# Patient Record
Sex: MALE | Race: WHITE | Employment: UNEMPLOYED | URBAN - METROPOLITAN AREA
[De-identification: names, ages, dates, MRNs, and addresses within clinical notes are randomized per-mention and may not be internally consistent; named-entity substitution may affect disease eponyms.]

---

## 2019-02-15 ENCOUNTER — APPOINTMENT (OUTPATIENT)
Dept: GENERAL RADIOLOGY | Age: 29
End: 2019-02-15
Attending: EMERGENCY MEDICINE
Payer: MEDICAID

## 2019-02-15 ENCOUNTER — HOSPITAL ENCOUNTER (EMERGENCY)
Age: 29
Discharge: HOME | End: 2019-02-15
Attending: EMERGENCY MEDICINE
Payer: MEDICAID

## 2019-02-15 VITALS
SYSTOLIC BLOOD PRESSURE: 142 MMHG | DIASTOLIC BLOOD PRESSURE: 87 MMHG | RESPIRATION RATE: 18 BRPM | TEMPERATURE: 98.3 F | WEIGHT: 193 LBS | HEIGHT: 71 IN | OXYGEN SATURATION: 97 % | BODY MASS INDEX: 27.02 KG/M2 | HEART RATE: 108 BPM

## 2019-02-15 DIAGNOSIS — J06.9 ACUTE URI: Primary | ICD-10-CM

## 2019-02-15 LAB
DEPRECATED S PYO AG THROAT QL EIA: NEGATIVE
FLUAV AG NPH QL IA: NEGATIVE
FLUBV AG NOSE QL IA: NEGATIVE

## 2019-02-15 PROCEDURE — 99283 EMERGENCY DEPT VISIT LOW MDM: CPT

## 2019-02-15 PROCEDURE — 87070 CULTURE OTHR SPECIMN AEROBIC: CPT

## 2019-02-15 PROCEDURE — 87147 CULTURE TYPE IMMUNOLOGIC: CPT

## 2019-02-15 PROCEDURE — 71046 X-RAY EXAM CHEST 2 VIEWS: CPT

## 2019-02-15 PROCEDURE — 87804 INFLUENZA ASSAY W/OPTIC: CPT

## 2019-02-15 PROCEDURE — 87880 STREP A ASSAY W/OPTIC: CPT

## 2019-02-15 RX ORDER — ALBUTEROL SULFATE 90 UG/1
2 AEROSOL, METERED RESPIRATORY (INHALATION)
Qty: 1 INHALER | Refills: 0 | Status: SHIPPED | OUTPATIENT
Start: 2019-02-15 | End: 2022-03-27

## 2019-02-15 RX ORDER — AZITHROMYCIN 250 MG/1
TABLET, FILM COATED ORAL
Qty: 6 TAB | Refills: 0 | Status: SHIPPED | OUTPATIENT
Start: 2019-02-15 | End: 2019-02-20

## 2019-02-15 NOTE — ED NOTES
Pt given printed discharge instructions and 3 script(s). Pt verbalized understanding of instructions and script(s). Pt verbalized importance of following up with PCP. Pt alert and oriented, in no acute distress, ambulatory with friend. Pt given work note.

## 2019-02-15 NOTE — ED NOTES
Emergency Department Nursing Plan of Care The Nursing Plan of Care is developed from the Nursing assessment and Emergency Department Attending provider initial evaluation. The plan of care may be reviewed in the ED Provider note. The Plan of Care was developed with the following considerations:  
Patient / Family readiness to learn indicated by:verbalized understanding and successful return demonstration Persons(s) to be included in education: patient Barriers to Learning/Limitations:No 
 
Signed Xander Padilla RN   
2/15/2019   12:30 PM

## 2019-02-15 NOTE — DISCHARGE INSTRUCTIONS
Patient Education        Upper Respiratory Infection (Cold): Care Instructions  Your Care Instructions    An upper respiratory infection, or URI, is an infection of the nose, sinuses, or throat. URIs are spread by coughs, sneezes, and direct contact. The common cold is the most frequent kind of URI. The flu and sinus infections are other kinds of URIs. Almost all URIs are caused by viruses. Antibiotics won't cure them. But you can treat most infections with home care. This may include drinking lots of fluids and taking over-the-counter pain medicine. You will probably feel better in 4 to 10 days. The doctor has checked you carefully, but problems can develop later. If you notice any problems or new symptoms, get medical treatment right away. Follow-up care is a key part of your treatment and safety. Be sure to make and go to all appointments, and call your doctor if you are having problems. It's also a good idea to know your test results and keep a list of the medicines you take. How can you care for yourself at home? · To prevent dehydration, drink plenty of fluids, enough so that your urine is light yellow or clear like water. Choose water and other caffeine-free clear liquids until you feel better. If you have kidney, heart, or liver disease and have to limit fluids, talk with your doctor before you increase the amount of fluids you drink. · Take an over-the-counter pain medicine, such as acetaminophen (Tylenol), ibuprofen (Advil, Motrin), or naproxen (Aleve). Read and follow all instructions on the label. · Before you use cough and cold medicines, check the label. These medicines may not be safe for young children or for people with certain health problems. · Be careful when taking over-the-counter cold or flu medicines and Tylenol at the same time. Many of these medicines have acetaminophen, which is Tylenol. Read the labels to make sure that you are not taking more than the recommended dose.  Too much acetaminophen (Tylenol) can be harmful. · Get plenty of rest.  · Do not smoke or allow others to smoke around you. If you need help quitting, talk to your doctor about stop-smoking programs and medicines. These can increase your chances of quitting for good. When should you call for help? Call 911 anytime you think you may need emergency care. For example, call if:    · You have severe trouble breathing.    Call your doctor now or seek immediate medical care if:    · You seem to be getting much sicker.     · You have new or worse trouble breathing.     · You have a new or higher fever.     · You have a new rash.    Watch closely for changes in your health, and be sure to contact your doctor if:    · You have a new symptom, such as a sore throat, an earache, or sinus pain.     · You cough more deeply or more often, especially if you notice more mucus or a change in the color of your mucus.     · You do not get better as expected. Where can you learn more? Go to http://wolfgang-german.info/. Enter B125 in the search box to learn more about \"Upper Respiratory Infection (Cold): Care Instructions. \"  Current as of: September 5, 2018  Content Version: 11.9  © 2706-7288 [employer]. Care instructions adapted under license by [employer] (which disclaims liability or warranty for this information). If you have questions about a medical condition or this instruction, always ask your healthcare professional. [place] [address] any warranty or liability for your use of this information.

## 2019-02-15 NOTE — LETTER
[facility] EMERGENCY DEPT 
[address] [place] [address] [id number] [phone] Work/School Note Date: 2/15/2019 To Whom It May concern: 
 
Octavio Evans was seen and treated today in the emergency room by the following provider(s): 
Attending Provider: Julisa De Jesus MD 
Nurse Practitioner: Vanna Mohamud NP. Octavio Evans may return to work on  Feb 18. Sincerely, Sergio Sheridan NP

## 2019-02-16 NOTE — ED PROVIDER NOTES
EMERGENCY DEPARTMENT HISTORY AND PHYSICAL EXAM 
 
Date: 2/15/2019 Patient Name: Laura Joyner History of Presenting Illness Chief Complaint Patient presents with  Cough History Provided By: Patient Chief Complaint: cough Duration: 3 Days Timing:  Acute Quality: loose productive Severity: Moderate Modifying Factors: none Associated Symptoms: chest tightness and sore throat HPI: Laura Joyner is a 29 y.o. male with a PMH of kidney stone laryngeal cancer who presents with cough for three days reports sore throat. Denies difficulty swallowing dysphagia hoarse voice fever. PCP: Iris Acosta MD 
 
Current Outpatient Medications Medication Sig Dispense Refill  azithromycin (ZITHROMAX) 250 mg tablet As directed Z pack 6 Tab 0  
 dextromethorphan-guaiFENesin (ROBITUSSIN-DM)  mg/5 mL syrup Take 10 mL by mouth every six (6) hours as needed for Cough. 1 Bottle 0  
 albuterol (PROVENTIL HFA, VENTOLIN HFA, PROAIR HFA) 90 mcg/actuation inhaler Take 2 Puffs by inhalation every four (4) hours as needed for Wheezing. 1 Inhaler 0 Past History Past Medical History: 
Past Medical History:  
Diagnosis Date  Bipolar 1 disorder ([facility] [address]  Cancer ([facility] [address]   
 laryngeal-3 years ago, in remission  Kidney stone  Psychiatric disorder ADHD  Trigeminal neuralgia Past Surgical History: No past surgical history on file. Family History: No family history on file. Social History: 
Social History Tobacco Use  Smoking status: Current Every Day Smoker Packs/day: 0.50 Years: 5.00 Pack years: 2.50  Smokeless tobacco: Current User Substance Use Topics  Alcohol use: Yes Comment: occ  Drug use: No  
  Comment: hx of marijuana/lsd/hallucinogenics Allergies: Allergies Allergen Reactions  Penicillins Hives Denies allergy on 2/15/2019  Seroquel [Quetiapine] Rash Review of Systems Review of Systems Constitutional: Negative for chills, fatigue and fever. HENT: Positive for sore throat. Negative for congestion. Eyes: Negative for redness. Respiratory: Positive for cough. Negative for chest tightness and wheezing. Cardiovascular: Negative for chest pain. Gastrointestinal: Negative for abdominal pain. Genitourinary: Negative for dysuria. Musculoskeletal: Negative for arthralgias, back pain, myalgias, neck pain and neck stiffness. Skin: Negative for rash. Neurological: Negative for dizziness, syncope, weakness, light-headedness, numbness and headaches. Hematological: Negative for adenopathy. Psychiatric/Behavioral: Negative for agitation and behavioral problems. All other systems reviewed and are negative. Physical Exam  
 
Vitals:  
 02/15/19 1151 02/15/19 1153 02/15/19 1213 BP: 142/87 Pulse: (!) 115 (!) 118 (!) 108 Resp: 18 Temp: 98.3 °F (36.8 °C) SpO2: 97% Weight: 87.5 kg (193 lb) Height: 5' 11\" (1.803 m) Physical Exam  
Constitutional: He is oriented to person, place, and time. He appears well-developed and well-nourished. HENT:  
Head: Normocephalic and atraumatic. Right Ear: External ear normal.  
Left Ear: External ear normal.  
Mouth/Throat: Oropharynx is clear and moist.  
Posterior oropharynx erythema Eyes: Conjunctivae are normal. Right eye exhibits no discharge. Left eye exhibits no discharge. Neck: Normal range of motion. Neck supple. Cardiovascular: Normal rate, regular rhythm and normal heart sounds. Pulmonary/Chest: Effort normal and breath sounds normal. No respiratory distress. He has no wheezes. Abdominal: Soft. Bowel sounds are normal. There is no tenderness. Musculoskeletal: Normal range of motion. He exhibits no edema. Lymphadenopathy:  
  He has no cervical adenopathy. Neurological: He is alert and oriented to person, place, and time. No cranial nerve deficit. Skin: Skin is warm and dry. Psychiatric: He has a normal mood and affect. His behavior is normal. Judgment and thought content normal.  
Nursing note and vitals reviewed. Diagnostic Study Results Labs - Recent Results (from the past 12 hour(s)) STREP AG SCREEN, GROUP A Collection Time: 02/15/19 12:26 PM  
Result Value Ref Range Group A Strep Ag ID NEGATIVE  NEG    
INFLUENZA A & B AG (RAPID TEST) Collection Time: 02/15/19 12:26 PM  
Result Value Ref Range Influenza A Antigen NEGATIVE  NEG Influenza B Antigen NEGATIVE  NEG Radiologic Studies -  
XR CHEST PA LAT Final Result Impression: No acute abnormality. CT Results  (Last 48 hours) None CXR Results  (Last 48 hours) 02/15/19 1222  XR CHEST PA LAT Final result Impression:  Impression: No acute abnormality. Narrative:  Exam:  2 view chest  
   
Indication: Cough Comparison to 9/15/2016. PA and lateral views demonstrate normal heart size. There is no acute process in  
the lung fields. The osseous structures are unremarkable. Medical Decision Making I am the first provider for this patient. I reviewed the vital signs, available nursing notes, past medical history, past surgical history, family history and social history. Vital Signs-Reviewed the patient's vital signs. Records Reviewed: Nursing Notes and Old Medical Records Disposition: 
home DISCHARGE NOTE:  
 
 
 
  Care plan outlined and precautions discussed. Patient has no new complaints, changes, or physical findings. Results of xray and tests were reviewed with the patient. All medications were reviewed with the patient; will d/c home with z pack robitussin dm albuterol HFA. All of pt's questions and concerns were addressed. Patient was instructed and agrees to follow up with PCP, as well as to return to the ED upon further deterioration. Patient is ready to go home. Follow-up Information Follow up With Specialties Details Why Contact Info Darien Morrissey MD Internal Medicine In 2 days  [address] [place] [id number] [phone] Discharge Medication List as of 2/15/2019  1:32 PM  
  
START taking these medications Details  
azithromycin (ZITHROMAX) 250 mg tablet As directed Z pack, Normal, Disp-6 Tab, R-0  
  
dextromethorphan-guaiFENesin (ROBITUSSIN-DM)  mg/5 mL syrup Take 10 mL by mouth every six (6) hours as needed for Cough., Normal, Disp-1 Bottle, R-0  
  
albuterol (PROVENTIL HFA, VENTOLIN HFA, PROAIR HFA) 90 mcg/actuation inhaler Take 2 Puffs by inhalation every four (4) hours as needed for Wheezing., Normal, Disp-1 Inhaler, R-0  
  
  
CONTINUE these medications which have NOT CHANGED Details  
oxyCODONE-acetaminophen (PERCOCET)  mg per tablet Take 1 Tab by mouth every four (4) hours as needed for Pain., Historical Med  
  
  
 
 
Provider Notes (Medical Decision Making): DDX URI bronchitis strep throat pneumonia Procedures: 
Procedures Diagnosis Clinical Impression: 1.  Acute URI

## 2019-02-18 LAB
BACTERIA SPEC CULT: ABNORMAL
BACTERIA SPEC CULT: ABNORMAL
SERVICE CMNT-IMP: ABNORMAL

## 2019-05-19 ENCOUNTER — HOSPITAL ENCOUNTER (INPATIENT)
Age: 29
LOS: 3 days | Discharge: HOME | DRG: 710 | End: 2019-05-22
Attending: EMERGENCY MEDICINE | Admitting: HOSPITALIST
Payer: MEDICAID

## 2019-05-19 ENCOUNTER — APPOINTMENT (OUTPATIENT)
Dept: GENERAL RADIOLOGY | Age: 29
DRG: 710 | End: 2019-05-19
Attending: EMERGENCY MEDICINE
Payer: MEDICAID

## 2019-05-19 ENCOUNTER — APPOINTMENT (OUTPATIENT)
Dept: ULTRASOUND IMAGING | Age: 29
DRG: 710 | End: 2019-05-19
Attending: EMERGENCY MEDICINE
Payer: MEDICAID

## 2019-05-19 DIAGNOSIS — F11.90 HEROIN USE: ICD-10-CM

## 2019-05-19 DIAGNOSIS — L02.414 ABSCESS OF LEFT UPPER EXTREMITY: ICD-10-CM

## 2019-05-19 DIAGNOSIS — F17.200 TOBACCO DEPENDENCE: ICD-10-CM

## 2019-05-19 DIAGNOSIS — F19.90 IV DRUG USER: ICD-10-CM

## 2019-05-19 DIAGNOSIS — L03.114 CELLULITIS OF ARM, LEFT: Primary | ICD-10-CM

## 2019-05-19 PROBLEM — A41.9 SEPSIS (HCC): Status: ACTIVE | Noted: 2019-05-19

## 2019-05-19 LAB
AMPHET UR QL SCN: NEGATIVE
ANION GAP SERPL CALC-SCNC: 6 MMOL/L (ref 5–15)
APPEARANCE UR: ABNORMAL
BACTERIA URNS QL MICRO: NEGATIVE /HPF
BARBITURATES UR QL SCN: NEGATIVE
BASOPHILS # BLD: 0 K/UL (ref 0–0.1)
BASOPHILS NFR BLD: 0 % (ref 0–1)
BENZODIAZ UR QL: NEGATIVE
BILIRUB UR QL: NEGATIVE
BUN SERPL-MCNC: 18 MG/DL (ref 6–20)
BUN/CREAT SERPL: 11 (ref 12–20)
CALCIUM SERPL-MCNC: 10.1 MG/DL (ref 8.5–10.1)
CANNABINOIDS UR QL SCN: NEGATIVE
CHLORIDE SERPL-SCNC: 101 MMOL/L (ref 97–108)
CO2 SERPL-SCNC: 30 MMOL/L (ref 21–32)
COCAINE UR QL SCN: NEGATIVE
COLOR UR: ABNORMAL
CREAT SERPL-MCNC: 1.59 MG/DL (ref 0.7–1.3)
DIFFERENTIAL METHOD BLD: ABNORMAL
DRUG SCRN COMMENT,DRGCM: ABNORMAL
EOSINOPHIL # BLD: 0 K/UL (ref 0–0.4)
EOSINOPHIL NFR BLD: 0 % (ref 0–7)
EPITH CASTS URNS QL MICRO: ABNORMAL /LPF
ERYTHROCYTE [DISTWIDTH] IN BLOOD BY AUTOMATED COUNT: 12.9 % (ref 11.5–14.5)
GLUCOSE SERPL-MCNC: 64 MG/DL (ref 65–100)
GLUCOSE UR STRIP.AUTO-MCNC: NEGATIVE MG/DL
HCT VFR BLD AUTO: 47.8 % (ref 36.6–50.3)
HGB BLD-MCNC: 15.4 G/DL (ref 12.1–17)
HGB UR QL STRIP: NEGATIVE
HYALINE CASTS URNS QL MICRO: >20 /LPF (ref 0–5)
IMM GRANULOCYTES # BLD AUTO: 0 K/UL (ref 0–0.04)
IMM GRANULOCYTES NFR BLD AUTO: 0 % (ref 0–0.5)
KETONES UR QL STRIP.AUTO: NEGATIVE MG/DL
LACTATE SERPL-SCNC: 1.6 MMOL/L (ref 0.4–2)
LEUKOCYTE ESTERASE UR QL STRIP.AUTO: NEGATIVE
LYMPHOCYTES # BLD: 1.4 K/UL (ref 0.8–3.5)
LYMPHOCYTES NFR BLD: 11 % (ref 12–49)
MCH RBC QN AUTO: 28.9 PG (ref 26–34)
MCHC RBC AUTO-ENTMCNC: 32.2 G/DL (ref 30–36.5)
MCV RBC AUTO: 89.8 FL (ref 80–99)
METHADONE UR QL: NEGATIVE
MONOCYTES # BLD: 1.7 K/UL (ref 0–1)
MONOCYTES NFR BLD: 13 % (ref 5–13)
NEUTS BAND NFR BLD MANUAL: 1 %
NEUTS SEG # BLD: 9.7 K/UL (ref 1.8–8)
NEUTS SEG NFR BLD: 75 % (ref 32–75)
NITRITE UR QL STRIP.AUTO: NEGATIVE
NRBC # BLD: 0 K/UL (ref 0–0.01)
NRBC BLD-RTO: 0 PER 100 WBC
OPIATES UR QL: POSITIVE
PCP UR QL: NEGATIVE
PH UR STRIP: 6 [PH] (ref 5–8)
PLATELET # BLD AUTO: 406 K/UL (ref 150–400)
PMV BLD AUTO: 9 FL (ref 8.9–12.9)
POTASSIUM SERPL-SCNC: 3.9 MMOL/L (ref 3.5–5.1)
PROT UR STRIP-MCNC: NEGATIVE MG/DL
RBC # BLD AUTO: 5.32 M/UL (ref 4.1–5.7)
RBC #/AREA URNS HPF: ABNORMAL /HPF (ref 0–5)
SODIUM SERPL-SCNC: 137 MMOL/L (ref 136–145)
SP GR UR REFRACTOMETRY: 1.03 (ref 1–1.03)
UA: UC IF INDICATED,UAUC: ABNORMAL
UROBILINOGEN UR QL STRIP.AUTO: 0.2 EU/DL (ref 0.2–1)
WBC # BLD AUTO: 12.8 K/UL (ref 4.1–11.1)
WBC URNS QL MICRO: ABNORMAL /HPF (ref 0–4)

## 2019-05-19 PROCEDURE — 81001 URINALYSIS AUTO W/SCOPE: CPT

## 2019-05-19 PROCEDURE — 87040 BLOOD CULTURE FOR BACTERIA: CPT

## 2019-05-19 PROCEDURE — 65660000000 HC RM CCU STEPDOWN

## 2019-05-19 PROCEDURE — 74011250636 HC RX REV CODE- 250/636: Performed by: PHYSICIAN ASSISTANT

## 2019-05-19 PROCEDURE — 74011250636 HC RX REV CODE- 250/636

## 2019-05-19 PROCEDURE — 96375 TX/PRO/DX INJ NEW DRUG ADDON: CPT

## 2019-05-19 PROCEDURE — 74011250636 HC RX REV CODE- 250/636: Performed by: HOSPITALIST

## 2019-05-19 PROCEDURE — 76882 US LMTD JT/FCL EVL NVASC XTR: CPT

## 2019-05-19 PROCEDURE — 36415 COLL VENOUS BLD VENIPUNCTURE: CPT

## 2019-05-19 PROCEDURE — 74011250637 HC RX REV CODE- 250/637: Performed by: HOSPITALIST

## 2019-05-19 PROCEDURE — 74011000258 HC RX REV CODE- 258: Performed by: PHYSICIAN ASSISTANT

## 2019-05-19 PROCEDURE — 74011250637 HC RX REV CODE- 250/637: Performed by: PHYSICIAN ASSISTANT

## 2019-05-19 PROCEDURE — 80048 BASIC METABOLIC PNL TOTAL CA: CPT

## 2019-05-19 PROCEDURE — 93005 ELECTROCARDIOGRAM TRACING: CPT

## 2019-05-19 PROCEDURE — 83605 ASSAY OF LACTIC ACID: CPT

## 2019-05-19 PROCEDURE — 71045 X-RAY EXAM CHEST 1 VIEW: CPT

## 2019-05-19 PROCEDURE — 96365 THER/PROPH/DIAG IV INF INIT: CPT

## 2019-05-19 PROCEDURE — 99284 EMERGENCY DEPT VISIT MOD MDM: CPT

## 2019-05-19 PROCEDURE — 85025 COMPLETE CBC W/AUTO DIFF WBC: CPT

## 2019-05-19 PROCEDURE — 74011000250 HC RX REV CODE- 250: Performed by: PHYSICIAN ASSISTANT

## 2019-05-19 PROCEDURE — 80307 DRUG TEST PRSMV CHEM ANLYZR: CPT

## 2019-05-19 RX ORDER — NAPROXEN 250 MG/1
500 TABLET ORAL
Status: COMPLETED | OUTPATIENT
Start: 2019-05-19 | End: 2019-05-19

## 2019-05-19 RX ORDER — ONDANSETRON 2 MG/ML
4 INJECTION INTRAMUSCULAR; INTRAVENOUS
Status: COMPLETED | OUTPATIENT
Start: 2019-05-19 | End: 2019-05-19

## 2019-05-19 RX ORDER — SODIUM CHLORIDE 0.9 % (FLUSH) 0.9 %
5-40 SYRINGE (ML) INJECTION EVERY 8 HOURS
Status: DISCONTINUED | OUTPATIENT
Start: 2019-05-19 | End: 2019-05-22 | Stop reason: HOSPADM

## 2019-05-19 RX ORDER — ACETAMINOPHEN 325 MG/1
650 TABLET ORAL
Status: DISCONTINUED | OUTPATIENT
Start: 2019-05-19 | End: 2019-05-19

## 2019-05-19 RX ORDER — SODIUM CHLORIDE 0.9 % (FLUSH) 0.9 %
5-40 SYRINGE (ML) INJECTION AS NEEDED
Status: DISCONTINUED | OUTPATIENT
Start: 2019-05-19 | End: 2019-05-19

## 2019-05-19 RX ORDER — SULFAMETHOXAZOLE AND TRIMETHOPRIM 800; 160 MG/1; MG/1
1 TABLET ORAL
Status: COMPLETED | OUTPATIENT
Start: 2019-05-19 | End: 2019-05-19

## 2019-05-19 RX ORDER — ONDANSETRON 2 MG/ML
INJECTION INTRAMUSCULAR; INTRAVENOUS
Status: COMPLETED
Start: 2019-05-19 | End: 2019-05-19

## 2019-05-19 RX ORDER — ACETAMINOPHEN 325 MG/1
650 TABLET ORAL
Status: DISCONTINUED | OUTPATIENT
Start: 2019-05-19 | End: 2019-05-22 | Stop reason: HOSPADM

## 2019-05-19 RX ORDER — IBUPROFEN 600 MG/1
600 TABLET ORAL
Status: DISCONTINUED | OUTPATIENT
Start: 2019-05-19 | End: 2019-05-19

## 2019-05-19 RX ORDER — VANCOMYCIN/0.9 % SOD CHLORIDE 1.5G/250ML
1500 PLASTIC BAG, INJECTION (ML) INTRAVENOUS ONCE
Status: COMPLETED | OUTPATIENT
Start: 2019-05-19 | End: 2019-05-20

## 2019-05-19 RX ORDER — TRAMADOL HYDROCHLORIDE 50 MG/1
50 TABLET ORAL
Status: COMPLETED | OUTPATIENT
Start: 2019-05-19 | End: 2019-05-19

## 2019-05-19 RX ORDER — IBUPROFEN 200 MG
1 TABLET ORAL EVERY 24 HOURS
Status: DISCONTINUED | OUTPATIENT
Start: 2019-05-19 | End: 2019-05-22 | Stop reason: HOSPADM

## 2019-05-19 RX ORDER — SODIUM CHLORIDE 9 MG/ML
50 INJECTION, SOLUTION INTRAVENOUS CONTINUOUS
Status: DISCONTINUED | OUTPATIENT
Start: 2019-05-19 | End: 2019-05-22 | Stop reason: HOSPADM

## 2019-05-19 RX ORDER — SODIUM CHLORIDE 0.9 % (FLUSH) 0.9 %
5-40 SYRINGE (ML) INJECTION AS NEEDED
Status: DISCONTINUED | OUTPATIENT
Start: 2019-05-19 | End: 2019-05-22 | Stop reason: HOSPADM

## 2019-05-19 RX ORDER — SODIUM CHLORIDE 0.9 % (FLUSH) 0.9 %
5-40 SYRINGE (ML) INJECTION EVERY 8 HOURS
Status: DISCONTINUED | OUTPATIENT
Start: 2019-05-19 | End: 2019-05-19

## 2019-05-19 RX ORDER — ONDANSETRON 2 MG/ML
4 INJECTION INTRAMUSCULAR; INTRAVENOUS
Status: DISCONTINUED | OUTPATIENT
Start: 2019-05-19 | End: 2019-05-22 | Stop reason: HOSPADM

## 2019-05-19 RX ORDER — KETOROLAC TROMETHAMINE 30 MG/ML
30 INJECTION, SOLUTION INTRAMUSCULAR; INTRAVENOUS
Status: DISCONTINUED | OUTPATIENT
Start: 2019-05-19 | End: 2019-05-22 | Stop reason: HOSPADM

## 2019-05-19 RX ADMIN — ONDANSETRON 4 MG: 2 INJECTION INTRAMUSCULAR; INTRAVENOUS at 20:59

## 2019-05-19 RX ADMIN — CEFTRIAXONE 1 G: 1 INJECTION, POWDER, FOR SOLUTION INTRAMUSCULAR; INTRAVENOUS at 20:49

## 2019-05-19 RX ADMIN — KETOROLAC TROMETHAMINE 30 MG: 30 INJECTION, SOLUTION INTRAMUSCULAR at 23:44

## 2019-05-19 RX ADMIN — SODIUM CHLORIDE 1000 ML: 900 INJECTION, SOLUTION INTRAVENOUS at 22:34

## 2019-05-19 RX ADMIN — Medication 10 ML: at 23:46

## 2019-05-19 RX ADMIN — NAPROXEN 500 MG: 250 TABLET ORAL at 20:48

## 2019-05-19 RX ADMIN — LIDOCAINE HYDROCHLORIDE 15 MG: 10; .005 INJECTION, SOLUTION EPIDURAL; INFILTRATION; INTRACAUDAL; PERINEURAL at 20:54

## 2019-05-19 RX ADMIN — VANCOMYCIN HYDROCHLORIDE 1500 MG: 10 INJECTION, POWDER, LYOPHILIZED, FOR SOLUTION INTRAVENOUS at 22:35

## 2019-05-19 RX ADMIN — SODIUM CHLORIDE 1000 ML: 900 INJECTION, SOLUTION INTRAVENOUS at 20:49

## 2019-05-19 RX ADMIN — SULFAMETHOXAZOLE AND TRIMETHOPRIM 1 TABLET: 800; 160 TABLET ORAL at 20:48

## 2019-05-19 RX ADMIN — TRAMADOL HYDROCHLORIDE 50 MG: 50 TABLET, FILM COATED ORAL at 22:35

## 2019-05-20 ENCOUNTER — ANESTHESIA EVENT (OUTPATIENT)
Dept: SURGERY | Age: 29
DRG: 710 | End: 2019-05-20
Payer: MEDICAID

## 2019-05-20 ENCOUNTER — ANESTHESIA (OUTPATIENT)
Dept: SURGERY | Age: 29
DRG: 710 | End: 2019-05-20
Payer: MEDICAID

## 2019-05-20 LAB
ATRIAL RATE: 106 BPM
CALCULATED P AXIS, ECG09: 58 DEGREES
CALCULATED R AXIS, ECG10: 77 DEGREES
CALCULATED T AXIS, ECG11: 63 DEGREES
DIAGNOSIS, 93000: NORMAL
P-R INTERVAL, ECG05: 168 MS
Q-T INTERVAL, ECG07: 322 MS
QRS DURATION, ECG06: 86 MS
QTC CALCULATION (BEZET), ECG08: 427 MS
VENTRICULAR RATE, ECG03: 106 BPM

## 2019-05-20 PROCEDURE — 10061 I&D ABSCESS COMP/MULTIPLE: CPT | Performed by: SURGERY

## 2019-05-20 PROCEDURE — 77030018836 HC SOL IRR NACL ICUM -A: Performed by: SURGERY

## 2019-05-20 PROCEDURE — 77030010509 HC AIRWY LMA MSK TELE -A: Performed by: NURSE ANESTHETIST, CERTIFIED REGISTERED

## 2019-05-20 PROCEDURE — 74011250636 HC RX REV CODE- 250/636: Performed by: HOSPITALIST

## 2019-05-20 PROCEDURE — 87205 SMEAR GRAM STAIN: CPT

## 2019-05-20 PROCEDURE — 65270000029 HC RM PRIVATE

## 2019-05-20 PROCEDURE — 74011250636 HC RX REV CODE- 250/636

## 2019-05-20 PROCEDURE — 74011250636 HC RX REV CODE- 250/636: Performed by: ANESTHESIOLOGY

## 2019-05-20 PROCEDURE — 74011250636 HC RX REV CODE- 250/636: Performed by: INTERNAL MEDICINE

## 2019-05-20 PROCEDURE — 76060000032 HC ANESTHESIA 0.5 TO 1 HR: Performed by: SURGERY

## 2019-05-20 PROCEDURE — 74011000258 HC RX REV CODE- 258: Performed by: HOSPITALIST

## 2019-05-20 PROCEDURE — 74011250636 HC RX REV CODE- 250/636: Performed by: SURGERY

## 2019-05-20 PROCEDURE — 99252 IP/OBS CONSLTJ NEW/EST SF 35: CPT | Performed by: SURGERY

## 2019-05-20 PROCEDURE — 74011000250 HC RX REV CODE- 250

## 2019-05-20 PROCEDURE — 74011250637 HC RX REV CODE- 250/637: Performed by: SURGERY

## 2019-05-20 PROCEDURE — 83036 HEMOGLOBIN GLYCOSYLATED A1C: CPT

## 2019-05-20 PROCEDURE — 76010000138 HC OR TIME 0.5 TO 1 HR: Performed by: SURGERY

## 2019-05-20 PROCEDURE — 77030011640 HC PAD GRND REM COVD -A: Performed by: SURGERY

## 2019-05-20 PROCEDURE — 74011250637 HC RX REV CODE- 250/637: Performed by: INTERNAL MEDICINE

## 2019-05-20 PROCEDURE — 74011000258 HC RX REV CODE- 258: Performed by: SURGERY

## 2019-05-20 PROCEDURE — 76210000006 HC OR PH I REC 0.5 TO 1 HR: Performed by: SURGERY

## 2019-05-20 PROCEDURE — 0J9H0ZZ DRAINAGE OF LEFT LOWER ARM SUBCUTANEOUS TISSUE AND FASCIA, OPEN APPROACH: ICD-10-PCS | Performed by: SURGERY

## 2019-05-20 PROCEDURE — 87075 CULTR BACTERIA EXCEPT BLOOD: CPT

## 2019-05-20 RX ORDER — HEPARIN SODIUM 5000 [USP'U]/ML
5000 INJECTION, SOLUTION INTRAVENOUS; SUBCUTANEOUS EVERY 8 HOURS
Status: DISCONTINUED | OUTPATIENT
Start: 2019-05-20 | End: 2019-05-22 | Stop reason: HOSPADM

## 2019-05-20 RX ORDER — MIDAZOLAM HYDROCHLORIDE 1 MG/ML
INJECTION, SOLUTION INTRAMUSCULAR; INTRAVENOUS AS NEEDED
Status: DISCONTINUED | OUTPATIENT
Start: 2019-05-20 | End: 2019-05-20 | Stop reason: HOSPADM

## 2019-05-20 RX ORDER — ONDANSETRON 2 MG/ML
INJECTION INTRAMUSCULAR; INTRAVENOUS AS NEEDED
Status: DISCONTINUED | OUTPATIENT
Start: 2019-05-20 | End: 2019-05-20 | Stop reason: HOSPADM

## 2019-05-20 RX ORDER — OXYCODONE HYDROCHLORIDE 5 MG/1
5 TABLET ORAL
Status: DISCONTINUED | OUTPATIENT
Start: 2019-05-20 | End: 2019-05-22 | Stop reason: HOSPADM

## 2019-05-20 RX ORDER — DIPHENHYDRAMINE HYDROCHLORIDE 50 MG/ML
12.5 INJECTION, SOLUTION INTRAMUSCULAR; INTRAVENOUS AS NEEDED
Status: DISCONTINUED | OUTPATIENT
Start: 2019-05-20 | End: 2019-05-20 | Stop reason: HOSPADM

## 2019-05-20 RX ORDER — HYDROMORPHONE HYDROCHLORIDE 2 MG/ML
0.5 INJECTION, SOLUTION INTRAMUSCULAR; INTRAVENOUS; SUBCUTANEOUS ONCE
Status: COMPLETED | OUTPATIENT
Start: 2019-05-20 | End: 2019-05-20

## 2019-05-20 RX ORDER — LORAZEPAM 2 MG/ML
1 INJECTION INTRAMUSCULAR
Status: DISCONTINUED | OUTPATIENT
Start: 2019-05-20 | End: 2019-05-22 | Stop reason: HOSPADM

## 2019-05-20 RX ORDER — SODIUM CHLORIDE 0.9 % (FLUSH) 0.9 %
5-40 SYRINGE (ML) INJECTION AS NEEDED
Status: DISCONTINUED | OUTPATIENT
Start: 2019-05-20 | End: 2019-05-20 | Stop reason: HOSPADM

## 2019-05-20 RX ORDER — SODIUM CHLORIDE, SODIUM LACTATE, POTASSIUM CHLORIDE, CALCIUM CHLORIDE 600; 310; 30; 20 MG/100ML; MG/100ML; MG/100ML; MG/100ML
25 INJECTION, SOLUTION INTRAVENOUS CONTINUOUS
Status: DISCONTINUED | OUTPATIENT
Start: 2019-05-20 | End: 2019-05-20 | Stop reason: HOSPADM

## 2019-05-20 RX ORDER — HYDROMORPHONE HYDROCHLORIDE 2 MG/ML
0.5 INJECTION, SOLUTION INTRAMUSCULAR; INTRAVENOUS; SUBCUTANEOUS
Status: DISCONTINUED | OUTPATIENT
Start: 2019-05-20 | End: 2019-05-22 | Stop reason: HOSPADM

## 2019-05-20 RX ORDER — LIDOCAINE HYDROCHLORIDE 10 MG/ML
0.1 INJECTION, SOLUTION EPIDURAL; INFILTRATION; INTRACAUDAL; PERINEURAL AS NEEDED
Status: DISCONTINUED | OUTPATIENT
Start: 2019-05-20 | End: 2019-05-20 | Stop reason: HOSPADM

## 2019-05-20 RX ORDER — SODIUM CHLORIDE 0.9 % (FLUSH) 0.9 %
5-40 SYRINGE (ML) INJECTION EVERY 8 HOURS
Status: DISCONTINUED | OUTPATIENT
Start: 2019-05-20 | End: 2019-05-20 | Stop reason: HOSPADM

## 2019-05-20 RX ORDER — KETAMINE HYDROCHLORIDE 10 MG/ML
INJECTION, SOLUTION INTRAMUSCULAR; INTRAVENOUS AS NEEDED
Status: DISCONTINUED | OUTPATIENT
Start: 2019-05-20 | End: 2019-05-20 | Stop reason: HOSPADM

## 2019-05-20 RX ORDER — FENTANYL CITRATE 50 UG/ML
25 INJECTION, SOLUTION INTRAMUSCULAR; INTRAVENOUS
Status: DISCONTINUED | OUTPATIENT
Start: 2019-05-20 | End: 2019-05-20 | Stop reason: HOSPADM

## 2019-05-20 RX ORDER — HYDROMORPHONE HYDROCHLORIDE 1 MG/ML
0.2 INJECTION, SOLUTION INTRAMUSCULAR; INTRAVENOUS; SUBCUTANEOUS
Status: DISCONTINUED | OUTPATIENT
Start: 2019-05-20 | End: 2019-05-20 | Stop reason: HOSPADM

## 2019-05-20 RX ORDER — PROPOFOL 10 MG/ML
INJECTION, EMULSION INTRAVENOUS AS NEEDED
Status: DISCONTINUED | OUTPATIENT
Start: 2019-05-20 | End: 2019-05-20 | Stop reason: HOSPADM

## 2019-05-20 RX ORDER — TRAMADOL HYDROCHLORIDE 50 MG/1
50 TABLET ORAL
Status: DISCONTINUED | OUTPATIENT
Start: 2019-05-20 | End: 2019-05-20

## 2019-05-20 RX ORDER — DEXAMETHASONE SODIUM PHOSPHATE 4 MG/ML
INJECTION, SOLUTION INTRA-ARTICULAR; INTRALESIONAL; INTRAMUSCULAR; INTRAVENOUS; SOFT TISSUE AS NEEDED
Status: DISCONTINUED | OUTPATIENT
Start: 2019-05-20 | End: 2019-05-20 | Stop reason: HOSPADM

## 2019-05-20 RX ORDER — FENTANYL CITRATE 50 UG/ML
INJECTION, SOLUTION INTRAMUSCULAR; INTRAVENOUS AS NEEDED
Status: DISCONTINUED | OUTPATIENT
Start: 2019-05-20 | End: 2019-05-20 | Stop reason: HOSPADM

## 2019-05-20 RX ORDER — HYDROMORPHONE HYDROCHLORIDE 2 MG/ML
INJECTION, SOLUTION INTRAMUSCULAR; INTRAVENOUS; SUBCUTANEOUS AS NEEDED
Status: DISCONTINUED | OUTPATIENT
Start: 2019-05-20 | End: 2019-05-20 | Stop reason: HOSPADM

## 2019-05-20 RX ORDER — LIDOCAINE HYDROCHLORIDE 20 MG/ML
INJECTION, SOLUTION EPIDURAL; INFILTRATION; INTRACAUDAL; PERINEURAL AS NEEDED
Status: DISCONTINUED | OUTPATIENT
Start: 2019-05-20 | End: 2019-05-20 | Stop reason: HOSPADM

## 2019-05-20 RX ADMIN — MIDAZOLAM HYDROCHLORIDE 4 MG: 1 INJECTION, SOLUTION INTRAMUSCULAR; INTRAVENOUS at 10:29

## 2019-05-20 RX ADMIN — KETOROLAC TROMETHAMINE 30 MG: 30 INJECTION, SOLUTION INTRAMUSCULAR at 10:42

## 2019-05-20 RX ADMIN — ONDANSETRON 4 MG: 2 INJECTION INTRAMUSCULAR; INTRAVENOUS at 10:38

## 2019-05-20 RX ADMIN — DEXAMETHASONE SODIUM PHOSPHATE 8 MG: 4 INJECTION, SOLUTION INTRA-ARTICULAR; INTRALESIONAL; INTRAMUSCULAR; INTRAVENOUS; SOFT TISSUE at 10:38

## 2019-05-20 RX ADMIN — Medication 10 ML: at 03:34

## 2019-05-20 RX ADMIN — Medication 10 ML: at 23:09

## 2019-05-20 RX ADMIN — FENTANYL CITRATE 25 MCG: 50 INJECTION, SOLUTION INTRAMUSCULAR; INTRAVENOUS at 11:26

## 2019-05-20 RX ADMIN — ONDANSETRON 4 MG: 2 INJECTION INTRAMUSCULAR; INTRAVENOUS at 05:09

## 2019-05-20 RX ADMIN — HYDROMORPHONE HYDROCHLORIDE 1 MG: 2 INJECTION, SOLUTION INTRAMUSCULAR; INTRAVENOUS; SUBCUTANEOUS at 10:38

## 2019-05-20 RX ADMIN — OXYCODONE HYDROCHLORIDE 5 MG: 5 TABLET ORAL at 13:36

## 2019-05-20 RX ADMIN — VANCOMYCIN HYDROCHLORIDE 1000 MG: 1 INJECTION, POWDER, LYOPHILIZED, FOR SOLUTION INTRAVENOUS at 21:45

## 2019-05-20 RX ADMIN — PIPERACILLIN AND TAZOBACTAM 3.38 G: 3; .375 INJECTION, POWDER, FOR SOLUTION INTRAVENOUS at 13:37

## 2019-05-20 RX ADMIN — HYDROMORPHONE HYDROCHLORIDE 1 MG: 2 INJECTION, SOLUTION INTRAMUSCULAR; INTRAVENOUS; SUBCUTANEOUS at 10:59

## 2019-05-20 RX ADMIN — HEPARIN SODIUM 5000 UNITS: 5000 INJECTION INTRAVENOUS; SUBCUTANEOUS at 21:48

## 2019-05-20 RX ADMIN — PIPERACILLIN AND TAZOBACTAM 3.38 G: 3; .375 INJECTION, POWDER, FOR SOLUTION INTRAVENOUS at 02:25

## 2019-05-20 RX ADMIN — OXYCODONE HYDROCHLORIDE 5 MG: 5 TABLET ORAL at 23:07

## 2019-05-20 RX ADMIN — PIPERACILLIN AND TAZOBACTAM 3.38 G: 3; .375 INJECTION, POWDER, FOR SOLUTION INTRAVENOUS at 05:09

## 2019-05-20 RX ADMIN — FENTANYL CITRATE 25 MCG: 50 INJECTION, SOLUTION INTRAMUSCULAR; INTRAVENOUS at 11:31

## 2019-05-20 RX ADMIN — LIDOCAINE HYDROCHLORIDE 100 MG: 20 INJECTION, SOLUTION EPIDURAL; INFILTRATION; INTRACAUDAL; PERINEURAL at 10:33

## 2019-05-20 RX ADMIN — KETAMINE HYDROCHLORIDE 50 MG: 10 INJECTION, SOLUTION INTRAMUSCULAR; INTRAVENOUS at 10:35

## 2019-05-20 RX ADMIN — HYDROMORPHONE HYDROCHLORIDE 0.5 MG: 2 INJECTION, SOLUTION INTRAMUSCULAR; INTRAVENOUS; SUBCUTANEOUS at 09:12

## 2019-05-20 RX ADMIN — Medication 10 ML: at 02:26

## 2019-05-20 RX ADMIN — OXYCODONE HYDROCHLORIDE 5 MG: 5 TABLET ORAL at 18:38

## 2019-05-20 RX ADMIN — FENTANYL CITRATE 25 MCG: 50 INJECTION, SOLUTION INTRAMUSCULAR; INTRAVENOUS at 11:34

## 2019-05-20 RX ADMIN — SODIUM CHLORIDE 125 ML/HR: 900 INJECTION, SOLUTION INTRAVENOUS at 11:40

## 2019-05-20 RX ADMIN — VANCOMYCIN HYDROCHLORIDE 1000 MG: 1 INJECTION, POWDER, LYOPHILIZED, FOR SOLUTION INTRAVENOUS at 09:20

## 2019-05-20 RX ADMIN — SODIUM CHLORIDE 125 ML/HR: 900 INJECTION, SOLUTION INTRAVENOUS at 00:07

## 2019-05-20 RX ADMIN — FENTANYL CITRATE 100 MCG: 50 INJECTION, SOLUTION INTRAMUSCULAR; INTRAVENOUS at 10:33

## 2019-05-20 RX ADMIN — SODIUM CHLORIDE, SODIUM LACTATE, POTASSIUM CHLORIDE, AND CALCIUM CHLORIDE 25 ML/HR: 600; 310; 30; 20 INJECTION, SOLUTION INTRAVENOUS at 10:17

## 2019-05-20 RX ADMIN — HYDROMORPHONE HYDROCHLORIDE 0.5 MG: 2 INJECTION, SOLUTION INTRAMUSCULAR; INTRAVENOUS; SUBCUTANEOUS at 21:51

## 2019-05-20 RX ADMIN — KETAMINE HYDROCHLORIDE 50 MG: 10 INJECTION, SOLUTION INTRAMUSCULAR; INTRAVENOUS at 11:07

## 2019-05-20 RX ADMIN — Medication 10 ML: at 13:38

## 2019-05-20 RX ADMIN — PROPOFOL 200 MG: 10 INJECTION, EMULSION INTRAVENOUS at 10:33

## 2019-05-20 RX ADMIN — Medication 10 ML: at 05:10

## 2019-05-20 NOTE — ANESTHESIA POSTPROCEDURE EVALUATION
Procedure(s): 
INCISION AND DRAINAGE LEFT  FOREARM ABSCESS. general 
 
Anesthesia Post Evaluation Patient location during evaluation: PACU Note status: Adequate. Level of consciousness: responsive to verbal stimuli and sleepy but conscious Pain management: satisfactory to patient Airway patency: patent Anesthetic complications: no 
Cardiovascular status: acceptable Respiratory status: acceptable Hydration status: acceptable Comments: +Post-Anesthesia Evaluation and Assessment Patient: Becca Cole MRN: [id number]  SSN: [id number] YOB: 1990  Age: 29 y.o. Sex: male Cardiovascular Function/Vital Signs /79 (BP 1 Location: Right leg, BP Patient Position: At rest)   Pulse 96   Temp 36.9 °C (98.4 °F)   Resp 22   Ht 5' 11\" (1.803 m)   Wt 74.8 kg (165 lb)   SpO2 100%   BMI 23.01 kg/m² Patient is status post Procedure(s): 
INCISION AND DRAINAGE LEFT  FOREARM ABSCESS. Nausea/Vomiting: Controlled. Postoperative hydration reviewed and adequate. Pain: 
Pain Scale 1: FLACC (05/20/19 1135) Pain Intensity 1: 7 (05/20/19 1134) Managed. Neurological Status:  
Neuro (WDL): Exceptions to WDL (05/20/19 1110) At baseline. Mental Status and Level of Consciousness: Arousable. Pulmonary Status:  
O2 Device: Nasal cannula (05/20/19 1130) Adequate oxygenation and airway patent. Complications related to anesthesia: None Post-anesthesia assessment completed. No concerns. Signed By: Benita Dickson MD  
 5/20/2019 Post anesthesia nausea and vomiting:  controlled Vitals Value Taken Time /79 5/20/2019 11:30 AM  
Temp 36.9 °C (98.4 °F) 5/20/2019 11:30 AM  
Pulse 91 5/20/2019 11:42 AM  
Resp 24 5/20/2019 11:42 AM  
SpO2 77 % 5/20/2019 11:42 AM  
Vitals shown include unvalidated device data.

## 2019-05-20 NOTE — PERIOP NOTES
TRANSFER - IN REPORT: 
 
Verbal report received from [facility] on [place]  being received from [phone] for ordered procedure Report consisted of patients Situation, Background, Assessment and  
Recommendations(SBAR). Information from the following report(s) SBAR, ED Summary, Intake/Output and MAR was reviewed with the receiving nurse. Opportunity for questions and clarification was provided. Assessment completed upon patients arrival to unit and care assumed.

## 2019-05-20 NOTE — ROUTINE PROCESS
Bedside and Verbal shift change report given to [facility] (oncoming nurse) by [place] (offgoing nurse). Report included the following information SBAR, Kardex, Intake/Output and MAR.

## 2019-05-20 NOTE — OP NOTES
[address]  OPERATIVE REPORT    Name:  Venkata Tomlinson  MR#:  [id number]  :  1990  ACCOUNT #:  [de-identified]  DATE OF SERVICE:  2019      PREOPERATIVE DIAGNOSIS:  Abscess left forearm. POSTOPERATIVE DIAGNOSIS:  Abscess left forearm. PROCEDURE PERFORMED:  Incision and drainage of left forearm abscess with pulse irrigation. SURGEON:  Saman Winn. Rosa Isela Kirkland MD    ASSISTANT:  None. SURGICAL FIRST ASSIST:  Justice Pham. ANESTHESIA:  General endotracheal anesthesia. COMPLICATIONS:  There are no complications. SPECIMENS REMOVED:  Left forearm abscess. Cultures for aerobic and anaerobic culture and gram stain. IMPLANTS:  none. ESTIMATED BLOOD LOSS:  10 mL. FINDINGS:  Pus filled subcutaneous abscess cavity in the left forearm distal to the antecubital fossa. DESCRIPTION OF OPERATION IN DETAIL:  After appropriate consent was obtained, the patient who was competent was brought to the operating room, made comfortable in the supine position and received general endotracheal anesthesia. The patient was prepped and draped in standard fashion. A time-out was completed. At this time, an incision was made through end of the left ventral forearm. This was about 5 to 10 cm distal to the antecubital fossa. The incision was made through an area that the patient had previously scored in an attempt to drain this abscess by himself through subcutaneous tissue sharply with a 15 blade and a pus filled abscess cavity was encountered. Aerobic and anaerobic cultures were obtained and the purulent material was aspirated from the field with suction device. The wound was inspected. There is no significant undermining. The wound was irrigated with 0.5 L of bacitracin saline solution and at this time, hemostasis was obtained with electrocautery.   The wound was then again inspected and felt to be clean and dry and was packed with Dakin's moistened Rosario gauze packing covered with sterile gauze plus a Kerlix wrap and a Ace wrap from the palm to the elbow. The patient was awakened, extubated, and transferred to the recovery room in stable condition.         Ben Lin MD      [id number]  D:  05/20/2019 11:13  T:  05/20/2019 12:31  JOB #:  [id number]  CC:  MD Yovany Hernandez MD

## 2019-05-20 NOTE — H&P
Hospitalist Admission NoteNAME: Becca Cole :  1990 MRN:  [id number] Date/Time:  2019 10:29 PM 
 
Patient PCP: Ludivina Solis MD 
______________________________________________________________________ Given the patient's current clinical presentation, I have a high level of concern for decompensation if discharged from the emergency department. Complex decision making was performed, which includes reviewing the patient's available past medical records, laboratory results, and x-ray films. My assessment of this patient's clinical condition and my plan of care is as follows. Assessment / Plan: 
Sepsis ( tachycardia/leukocytosis) due to complex abscess left upper extremity 
-admit for IV AB and surgery eval  
No fever. Lactic acid normal  
-c/w broad spectrum AB ( h/o IVDA): vanco + zosyn 
-follow cultures 
-surgery consult in am for I&D Keep NPO, no AC till seen by surgeon  
-aggressive IVF  
-check hba1c to r/o DM  
-pain management with tylenol/ ibuprofen Avoid narcotic use CAPRI 
-baseline cr 0.9, admission 1.59 
-aggressive IVF 
-monitor closely. Avoid nephrotoxic drugs, adjust all meds to GFR. Tobacco abuse / heroin abuse/IVDA -nicotine patch 
-educated on smocking cessation  
-watch for withdrawing ADHD, not on meds currently H/o laryngeal cancer in remission Code Status: full code Surrogate Decision Maker: father DVT Prophylaxis: SCD pending decision on surgery GI Prophylaxis: not indicated Baseline: independent ; lives with his father Subjective: CHIEF COMPLAINT: left arm draining abscess HISTORY OF PRESENT ILLNESS:    
Becca Cole is a 29 y.o.  male who presents with above complaint. Pt admits to using dirty needles for injecting drugs. He started with Left arm pain/swelling/erythema ~ 2 days ago. Sx was getting progressively worse.  He attempted to merlene an abscess by himself without any result. He noticed moderate purulent drainage. He denies fever/chills. He admits to poor appetite and poor po intake in the last 2 days. Pt is currently in handcuffs with Adela Anne at bedside. Pt was found in [facility] by polo for a call for service. Pt was handed off to [facility], as he had a warrant out for his arrest.  In the middle of transfer, pt started complaining of weeping, purulent abscess to L arm and was brought to ED. Vs: 98.2 °F (36.8 °C) - 125 - 111/62 - 18 - 99% RA We were asked to admit for work up and evaluation of the above problems. Past Medical History:  
Diagnosis Date  Bipolar 1 disorder ([facility] [address]  Cancer ([facility] [address]   
 laryngeal-3 years ago, in remission  Kidney stone  Psychiatric disorder ADHD  Trigeminal neuralgia Pertinent surgical history: none per pt Social History Tobacco Use  Smoking status: Current Every Day Smoker Packs/day: 0.50 Years: 5.00 Pack years: 2.50  Smokeless tobacco: Current User Substance Use Topics  Alcohol use: Yes Comment: occ Pertinent family history: no h/o DM Allergies Allergen Reactions  Penicillins Hives Denies allergy on 2/15/2019  Seroquel [Quetiapine] Rash Prior to Admission medications Medication Sig Start Date End Date Taking? Authorizing Provider  
dextromethorphan-guaiFENesin (ROBITUSSIN-DM)  mg/5 mL syrup Take 10 mL by mouth every six (6) hours as needed for Cough. 2/15/19   Pascual London NP  
albuterol (PROVENTIL HFA, VENTOLIN HFA, PROAIR HFA) 90 mcg/actuation inhaler Take 2 Puffs by inhalation every four (4) hours as needed for Wheezing. 2/15/19   Pascual London NP  
 
 
REVIEW OF SYSTEMS:    
I am not able to complete the review of systems because: The patient is intubated and sedated The patient has altered mental status due to his acute medical problems The patient has baseline aphasia from prior stroke(s) The patient has baseline dementia and is not reliable historian The patient is in acute medical distress and unable to provide information Total of 12 systems reviewed as follows:   
   POSITIVE= underlined text  Negative = text not underlined General:  fever, chills, sweats, generalized weakness, weight loss/gain,  
   loss of appetite Eyes:    blurred vision, eye pain, loss of vision, double vision ENT:    rhinorrhea, pharyngitis Respiratory:   cough, sputum production, SOB, RODAS, wheezing, pleuritic pain  
Cardiology:   chest pain, palpitations, orthopnea, PND, edema, syncope Gastrointestinal:  abdominal pain , N/V, diarrhea, dysphagia, constipation, bleeding Genitourinary:  frequency, urgency, dysuria, hematuria, incontinence Muskuloskeletal :  arthralgia, myalgia, back pain Hematology:  easy bruising, nose or gum bleeding, lymphadenopathy Dermatological: rash, ulceration, pruritis, color change / jaundice Endocrine:   hot flashes or polydipsia Neurological:  headache, dizziness, confusion, focal weakness, paresthesia, Speech difficulties, memory loss, gait difficulty Psychological: Feelings of anxiety, depression, agitation Objective: VITALS:   
Visit Vitals /78 (BP 1 Location: Right arm, BP Patient Position: At rest) Pulse (!) 119 Temp 98.2 °F (36.8 °C) Resp 18 Ht 5' 11\" (1.803 m) Wt 74.8 kg (165 lb) SpO2 98% BMI 23.01 kg/m² PHYSICAL EXAM: 
 
General:    Alert, cooperative, no distress, appears stated age. HEENT: Atraumatic, anicteric sclerae, pink conjunctivae No oral ulcers, mucosa moist, throat clear, dentition fair Neck:  Supple, symmetrical,  thyroid: non tender Lungs:   Clear to auscultation bilaterally. No Wheezing or Rhonchi. No rales. Chest wall:  No tenderness  No Accessory muscle use. Heart:   Regular  rhythm,  No  murmur   No edema Abdomen:   Soft, non-tender. Not distended. Bowel sounds normal 
Extremities: No cyanosis. No clubbing,   
  Skin turgor normal, Capillary refill normal, Radial dial pulse 2+ L forearm with erythema from antecubital fossa to wrist /tender / firm to touch/warm Skin:     Not pale. Not Jaundiced  No rashes Covered in tattoos Psych:  Good insight. Not depressed. Not anxious or agitated. Neurologic: EOMs intact. No facial asymmetry. No aphasia or slurred speech. Symmetrical strength, Sensation grossly intact. Alert and oriented X 4.  
 
_______________________________________________________________________ Care Plan discussed with: 
  Comments Patient y Family RN y   
Care Manager Consultant:  kiesha ED provider   
_______________________________________________________________________ Expected  Disposition:  
Home with Family y HH/PT/OT/RN   
SNF/LTC   
MANNY   
________________________________________________________________________ TOTAL TIME:  65 Minutes Critical Care Provided     Minutes non procedure based Comments Reviewed previous records  
>50% of visit spent in counseling and coordination of care  Discussion with patient and/or family and questions answered 
  
 
________________________________________________________________________ Signed: Ana Laura Do MD 
 
Procedures: see electronic medical records for all procedures/Xrays and details which were not copied into this note but were reviewed prior to creation of Plan. LAB DATA REVIEWED:   
Recent Results (from the past 24 hour(s)) CBC WITH AUTOMATED DIFF Collection Time: 05/19/19  8:23 PM  
Result Value Ref Range WBC 12.8 (H) 4.1 - 11.1 K/uL  
 RBC 5.32 4.10 - 5.70 M/uL  
 HGB 15.4 12.1 - 17.0 g/dL HCT 47.8 36.6 - 50.3 % MCV 89.8 80.0 - 99.0 FL  
 MCH 28.9 26.0 - 34.0 PG  
 MCHC 32.2 30.0 - 36.5 g/dL  
 RDW 12.9 11.5 - 14.5 % PLATELET 886 (H) 239 - 400 K/uL MPV 9.0 8.9 - 12.9 FL  
 NRBC 0.0 0  WBC ABSOLUTE NRBC 0.00 0.00 - 0.01 K/uL NEUTROPHILS 75 32 - 75 % BAND NEUTROPHILS 1 % LYMPHOCYTES 11 (L) 12 - 49 % MONOCYTES 13 5 - 13 % EOSINOPHILS 0 0 - 7 % BASOPHILS 0 0 - 1 % IMMATURE GRANULOCYTES 0 0.0 - 0.5 % ABS. NEUTROPHILS 9.7 (H) 1.8 - 8.0 K/UL  
 ABS. LYMPHOCYTES 1.4 0.8 - 3.5 K/UL  
 ABS. MONOCYTES 1.7 (H) 0.0 - 1.0 K/UL  
 ABS. EOSINOPHILS 0.0 0.0 - 0.4 K/UL  
 ABS. BASOPHILS 0.0 0.0 - 0.1 K/UL  
 ABS. IMM. GRANS. 0.0 0.00 - 0.04 K/UL  
 DF MANUAL METABOLIC PANEL, BASIC Collection Time: 05/19/19  8:23 PM  
Result Value Ref Range Sodium 137 136 - 145 mmol/L Potassium 3.9 3.5 - 5.1 mmol/L Chloride 101 97 - 108 mmol/L  
 CO2 30 21 - 32 mmol/L Anion gap 6 5 - 15 mmol/L Glucose 64 (L) 65 - 100 mg/dL BUN 18 6 - 20 MG/DL Creatinine 1.59 (H) 0.70 - 1.30 MG/DL  
 BUN/Creatinine ratio 11 (L) 12 - 20 GFR est AA >60 >60 ml/min/1.73m2 GFR est non-AA 52 (L) >60 ml/min/1.73m2 Calcium 10.1 8.5 - 10.1 MG/DL  
LACTIC ACID Collection Time: 05/19/19  9:38 PM  
Result Value Ref Range Lactic acid 1.6 0.4 - 2.0 MMOL/L  
DRUG SCREEN, URINE Collection Time: 05/19/19  9:38 PM  
Result Value Ref Range AMPHETAMINES NEGATIVE  NEG    
 BARBITURATES NEGATIVE  NEG BENZODIAZEPINES NEGATIVE  NEG    
 COCAINE NEGATIVE  NEG METHADONE NEGATIVE  NEG    
 OPIATES POSITIVE (A) NEG    
 PCP(PHENCYCLIDINE) NEGATIVE  NEG    
 THC (TH-CANNABINOL) NEGATIVE  NEG Drug screen comment (NOTE) URINALYSIS W/ REFLEX CULTURE Collection Time: 05/19/19  9:43 PM  
Result Value Ref Range Color YELLOW/STRAW Appearance TURBID (A) CLEAR Specific gravity 1.026 1.003 - 1.030    
 pH (UA) 6.0 5.0 - 8.0 Protein NEGATIVE  NEG mg/dL Glucose NEGATIVE  NEG mg/dL Ketone NEGATIVE  NEG mg/dL Bilirubin NEGATIVE  NEG  Blood NEGATIVE  NEG    
 Urobilinogen 0.2 0.2 - 1.0 EU/dL Nitrites NEGATIVE  NEG Leukocyte Esterase NEGATIVE  NEG    
 WBC 0-4 0 - 4 /hpf  
 RBC 0-5 0 - 5 /hpf Epithelial cells FEW FEW /lpf Bacteria NEGATIVE  NEG /hpf  
 UA:UC IF INDICATED CULTURE NOT INDICATED BY UA RESULT CNI Hyaline cast >20 (H) 0 - 5 /lpf EKG, 12 LEAD, INITIAL Collection Time: 05/19/19  9:51 PM  
Result Value Ref Range Ventricular Rate 106 BPM  
 Atrial Rate 106 BPM  
 P-R Interval 168 ms QRS Duration 86 ms  
 Q-T Interval 322 ms QTC Calculation (Bezet) 427 ms Calculated P Axis 58 degrees Calculated R Axis 77 degrees Calculated T Axis 63 degrees Diagnosis Sinus tachycardia When compared with ECG of 14-SEP-2016 02:19, No significant change was found

## 2019-05-20 NOTE — BRIEF OP NOTE
BRIEF OPERATIVE NOTE Date of Procedure: 5/20/2019 Preoperative Diagnosis: ABSCESS LEFT FOREARM Postoperative Diagnosis: ABSCESS LEFT FOREARM Procedure(s): 
INCISION AND DRAINAGE LEFT  FOREARM ABSCESS Surgeon(s) and Role: Sincere Castillo MD - Primary Surgical Assistant: NONE Surgical Staff: 
Circ-1: Roni Lora RN Scrub Tech-1: Sapna Linn Surgical First Assistant:  Jt Bernard No case tracking events are documented in the log. Anesthesia: General  
Estimated Blood Loss: 10 cc Specimens:  
ID Type Source Tests Collected by Time Destination 1 :  left forearm abscess Wound Arm, Left CULTURE, ANAEROBIC, CULTURE, WOUND W GRAM STAIN Raj Shelton MD 5/20/2019 1053 Microbiology Findings: pus filled subcutaneous abscess cavity left forearm distal to antecubital fossa Complications: none Implants: * No implants in log *

## 2019-05-20 NOTE — ED NOTES
Assumed care of pt at this time. Pt currently in handcuffs with Adela Anne at bedside. Pt was found in [facility] by polo for a call for service. Pt was handed off to [facility], as he had a warrant out for his arrest.  In the middle of transfer, pt started complaining of weeping, purulent abscess to L arm. Pt admitted to using dirty heroin needles.

## 2019-05-20 NOTE — PROGRESS NOTES
Hospitalist Progress Note NAME: Richard Lee :  1990 MRN:  [id number] Assessment / Plan: 
Sepsis ( tachycardia/leukocytosis) due to complex abscess left upper extremity S/p Incision and drainage of left forearm abscess with pulse irrigation on  Fu wound cx , c/w IV vanco and zosyn No fever. Lactic acid normal  
-aggressive IVF  
-check hba1c to r/o DM  
-pain management with tylenol/ ibuprofen , added prn oxycodone Will avoid IV dilaudid - 
  
CAPRI 
-baseline cr 0.9, admission 1.59 
-aggressive IVF 
-monitor closely. Avoid nephrotoxic drugs, adjust all meds to GFR.  
  
Tobacco abuse / heroin abuse/IVDA -nicotine patch 
-educated on smocking cessation  
-watch for withdrawal  
  
ADHD, not on meds currently H/o laryngeal cancer in remission  
  
  
  
Code Status: full code Surrogate Decision Maker: father  
  
DVT Prophylaxis:heparin GI Prophylaxis: not indicated 
  
Baseline: independent ; lives with his father Body mass index is 23.01 kg/m². therefore classifying patient as normal wt  
-counseled exercise/diet. Patient receptive. Subjective: Chief Complaint / Reason for Physician Visit Fu sepsis and LUE abscess. Discussed with RN events overnight. Pt c/o pain , stating that tylenol and ibuprofen not enough Review of Systems: 
Symptom Y/N Comments  Symptom Y/N Comments Fever/Chills n   Chest Pain n   
Poor Appetite    Edema Cough n   Abdominal Pain n   
Sputum    Joint Pain SOB/RODAS n   Pruritis/Rash Nausea/vomit n   Tolerating PT/OT Diarrhea    Tolerating Diet Constipation    Other Could NOT obtain due to:   
 
Objective: VITALS:  
Last 24hrs VS reviewed since prior progress note. Most recent are: 
Patient Vitals for the past 24 hrs: 
 Temp Pulse Resp BP SpO2  
19 0743 97.9 °F (36.6 °C) 88 18 125/82 99 % 19 0506 98 °F (36.7 °C) 81 18 118/86 99 % 05/19/19 2340 98.1 °F (36.7 °C) (!) 106 18 130/81 99 % 05/19/19 2300 98.8 °F (37.1 °C) (!) 112 14 122/78 98 % 05/19/19 2245    125/73 98 % 05/19/19 2230    122/79 99 % 05/19/19 2216  (!) 119 18 119/78 98 % 05/19/19 2002 98.2 °F (36.8 °C) (!) 125 18 111/62 99 % Intake/Output Summary (Last 24 hours) at 5/20/2019 1851 Last data filed at 5/19/2019 2148 Gross per 24 hour Intake 1050 ml Output  Net 1050 ml PHYSICAL EXAM: 
General: WD, WN. Alert, cooperative, no acute distress   
EENT:  EOMI. Anicteric sclerae. MMM Resp:  CTA bilaterally, no wheezing or rales. No accessory muscle use CV:  Regular  rhythm,  No edema GI:  Soft, Non distended, Non tender.  +Bowel sounds Neurologic:  Alert and oriented X 3, normal speech, Psych:   Good insight. Not anxious nor agitated Skin:  No rashes. No jaundice MSK: Left forearm with dressing intact Reviewed most current lab test results and cultures  YES Reviewed most current radiology test results   YES Review and summation of old records today    NO Reviewed patient's current orders and MAR    YES 
PMH/SH reviewed - no change compared to H&P 
________________________________________________________________________ Care Plan discussed with: 
  Comments Patient x Family RN x Care Manager Consultant Multidiciplinary team rounds were held today with , nursing, pharmacist and clinical coordinator. Patient's plan of care was discussed; medications were reviewed and discharge planning was addressed. ________________________________________________________________________ Total NON critical care TIME: 30   Minutes Total CRITICAL CARE TIME Spent:   Minutes non procedure based Comments >50% of visit spent in counseling and coordination of care    
________________________________________________________________________ Clara Aguirre MD  
 
 Procedures: see electronic medical records for all procedures/Xrays and details which were not copied into this note but were reviewed prior to creation of Plan. LABS: 
I reviewed today's most current labs and imaging studies. Pertinent labs include: 
Recent Labs 05/19/19 2023 WBC 12.8* HGB 15.4 HCT 47.8 * Recent Labs 05/19/19 2023   
K 3.9  CO2 30  
GLU 64* BUN 18 CREA 1.59* CA 10.1 Signed: General Veronica MD

## 2019-05-20 NOTE — PROGRESS NOTES
ABSCESS ABSCESS Bedside and Verbal shift change report given to CLIF Gaona  (oncoming nurse) by Mary Ortega (offgoing nurse). Report included the following information SBAR and Kardex. Zone Phone:   [phone] Significant changes during shift:   
 
 
Patient Information Richard Reddyole 29 y.o. 
5/19/2019  7:53 PM by Shelbi Richard MD. Richard Console was admitted from Home 
 
Problem List 
 
Patient Active Problem List  
 Diagnosis Date Noted  Sepsis ([facility] [address] 05/19/2019  Abscess of left upper extremity 05/19/2019  Pneumonia 09/14/2016  Acute renal failure (ARF) ([address] 09/14/2016  Diabetes mellitus type 2, controlled ([facility] [address] 09/14/2016  Tear of medial cartilage or meniscus of knee, current 09/18/2013 Past Medical History:  
Diagnosis Date  Bipolar 1 disorder ([facility] [address]  Cancer ([facility] [address]   
 laryngeal-3 years ago, in remission  Kidney stone  Psychiatric disorder ADHD  Trigeminal neuralgia Core Measures: CVA: No No 
CHF:No No 
PNA:No No 
 
Post Op Surgical (If Applicable):  
 
Number times ambulated in hallway past shift:  0 Number of times OOB to chair past shift:   2 NG Tube: No 
Incentive Spirometer: No 
Drains: No   Volume  0 Dressing Present:  Yes Flatus:  Yes Activity Status: OOB to Chair Yes Ambulated this shift Yes Bed Rest No 
 
Supplemental O2: (If Applicable) NC No 
NRB No 
Venti-mask No 
On  Liters/min LINES AND DRAINS: 
Central Line? No Placement date  Reason Medically Necessary PICC LINE? No Placement date Reason Medically Necessary Urinary Catheter? No Placement Date  Reason Medically Necessary DVT prophylaxis: DVT prophylaxis Med- Yes DVT prophylaxis SCD or CRYSTAL- No  
 
Wounds: (If Applicable) Wounds- Yes Location left arm Patient Safety: 
 
Falls Score Total Score: 1 Safety Level_______ Bed Alarm On? Yes Sitter? No 
 
Plan for upcoming shift: dressing to left arm Discharge Plan: No  
 
 Active Consults: 
IP CONSULT TO GENERAL SURGERY

## 2019-05-20 NOTE — PROGRESS NOTES
Pharmacy Automatic Renal Dosing Protocol - Antimicrobials Indication for Antimicrobials: LUE complex abscess Current Regimen of Each Antimicrobial: 
Zosyn 3.375 gm IV q8hr (Start  - Day 2 Vancomycin  1500 mg IV x 1 dose, pharmacy to dose (Start Date ; Day # 2) Previous Antimicrobial Therapy: 
Ceftriaxone 1 gm IV x 1 dose (Start Date ; Day Bactrim DS 1 tab x 1 dose (Start Date ; Day Goal Level: VANCOMYCIN TROUGH GOAL RANGE Vancomycin Trough: 15 - 20 mcg/mL Date Dose & Interval Measured (mcg/mL) Extrapolated (mcg/mL) Date & time of next level:  
 
Significant Cultures:  
 blood x 2 : NG - Prelim  Wound - APPARENT TINY GRAM POSITIVE COCCI IN PAIRS - Prelim  Anaerobic - Prelim Radiology / Imaging results: (X-ray, CT scan or MRI):  
 
Paralysis, amputations, malnutrition: not noted Labs: 
Recent Labs 19 CREA 1.59* BUN 18 WBC 12.8* Temp (24hrs), Av.2 °F (36.8 °C), Min:97.5 °F (36.4 °C), Max:98.8 °F (37.1 °C) Creatinine Clearance (mL/min) or Dialysis: 73.2 ml/min Impression/Plan:  
Continue Zosyn 3.375 gm IV q8hr Vancomycin loading dose of 1500 mg IV x 1 dose, followed by vancomycin maintenance dose of 1000 mg IV q12hr for a predicted trough of 15.2 mcg/ml. Vancomycin Trough prior to 1000 dose tomorrow. Antimicrobial stop date TBD Pharmacy will follow daily and adjust medications as appropriate for renal function and/or serum levels. Thank you, Mary Ellen Toney, PHARMD 
 
Recommended duration of therapy 
http://[facility]/[place]/[facility]/Pharmacy/Clinical%20Companion/Duration%20of%20ABX%20therapy. docx Renal Dosing 
http://[facility]/[place]/[facility]/Pharmacy/Clinical%20Companion/Renal%[id number]. pdf

## 2019-05-20 NOTE — PROGRESS NOTES
Pharmacy Automatic Renal Dosing Protocol - Antimicrobials Indication for Antimicrobials: LUE complex abscess Current Regimen of Each Antimicrobial: 
Zosyn 3.375 gm IV q8hr Vancomycin  1500 mg IV x 1 dose, pharmacy to dose (Start Date ; Day # 1) Previous Antimicrobial Therapy: 
Ceftriaxone 1 gm IV x 1 dose (Start Date ; Day Bactrim DS 1 tab x 1 dose (Start Date ; Day Goal Level: VANCOMYCIN TROUGH GOAL RANGE Vancomycin Trough: 15 - 20 mcg/mL Significant Cultures:  
 blood: pending Paralysis, amputations, malnutrition: not noted Labs: 
Recent Labs 19 CREA 1.59* BUN 18 WBC 12.8* Temp (24hrs), Av.5 °F (36.9 °C), Min:98.2 °F (36.8 °C), Max:98.8 °F (37.1 °C) Creatinine Clearance (mL/min) or Dialysis: 73.2 ml/min Impression/Plan:  
Continue Zosyn 3.375 gm IV q8hr Vancomycin loading dose of 1500 mg IV x 1 dose, followed by vancomycin maintenance dose of 1000 mg IV q12hr for a predicted trough of 15.2 mcg/ml. Empiric dose adjustment may be needed tomorrow pending improvement of CAPRI. Antimicrobial stop date TBD Pharmacy will follow daily and adjust medications as appropriate for renal function and/or serum levels. Thank you, Teresa Clements, PHARMD

## 2019-05-20 NOTE — PROGRESS NOTES
Problem: Falls - Risk of 
Goal: *Absence of Falls Description Document Rui Jones Fall Risk and appropriate interventions in the flowsheet. Outcome: Progressing Towards Goal 
  
Problem: Patient Education: Go to Patient Education Activity Goal: Patient/Family Education Outcome: Progressing Towards Goal

## 2019-05-20 NOTE — PERIOP NOTES
Handoff Report from Operating Room to PACU Report received from CASSIE Pang RN and EMILIO [place] CRNA regarding Norma Destini. Surgeon(s): 
Kevin Brooke MD  And Procedure(s) (LRB): 
INCISION AND DRAINAGE LEFT  FOREARM ABSCESS (Left)  confirmed  
with allergies, drains and dressings discussed. Anesthesia type, drugs, patient history, complications, estimated blood loss, vital signs, intake and output, and last pain medication, lines, reversal medications and temperature were reviewed.

## 2019-05-20 NOTE — ED PROVIDER NOTES
EMERGENCY DEPARTMENT HISTORY AND PHYSICAL EXAM 
 
 
Date: 5/19/2019 Patient Name: Stewart Kong History of Presenting Illness Chief Complaint Patient presents with  Abscess Patient w/ left arm abscess w/ drainage from shooting heroine with dirty needles. Patient in police custody. History Provided By: Patient HPI: Stewart Kong, 29 y.o. male presents in custody of police with c/o L arm pain, redness and swelling. He advised he injects heroin with dirty needles. He attempted to merlene an abscess to the L proximal forearm 2 days ago, but the redness, swelling and pain have progressively worsened. He advised there was moderate purulent drainage with attempt to merlene the abscess several days ago. It has not drained since that time. He reports he has not eaten over the last few days. He was unsure of fever. He c/o numbness to the L UE. He is a smoker. He denied vomiting or diarrhea. Chief Complaint: multiple skin abscesses with h/o heroin use Duration: 1 Weeks Timing:  Acute Location: L forearm Quality: Pressure Severity: Severe Modifying Factors: pt injects heroin, attempted to drain an abscess to this area 2 days ago Associated Symptoms: denies any other associated signs or symptoms There are no other complaints, changes, or physical findings at this time. PCP: Edna Jin MD 
 
Current Facility-Administered Medications Medication Dose Route Frequency Provider Last Rate Last Dose  sodium chloride (NS) flush 5-40 mL  5-40 mL IntraVENous Q8H Nimesh [place]      
 sodium chloride (NS) flush 5-40 mL  5-40 mL IntraVENous PRN Veronica Jurado      
 trimethoprim-sulfamethoxazole (BACTRIM DS, SEPTRA DS) 160-800 mg per tablet 1 Tab  1 Tab Oral NOW Veronica Stoddadr      
 naproxen (NAPROSYN) tablet 500 mg  500 mg Oral NOW Nimesh Kirkland [place]      
 cefTRIAXone (ROCEPHIN) 1 g in 0.9% sodium chloride (MBP/ADV) 50 mL  1 g IntraVENous NOW Mika Santamaria[address]      
 lidocaine-EPINEPHrine (PF) (XYLOCAINE) 1 %-1:200,000 injection 15 mg  1.5 mL SubCUTAneous ONCE Ethelle [address] [place]      
 sodium chloride 0.9 % bolus infusion 1,000 mL  1,000 mL IntraVENous [address] [address] [place] Current Outpatient Medications Medication Sig Dispense Refill  dextromethorphan-guaiFENesin (ROBITUSSIN-DM)  mg/5 mL syrup Take 10 mL by mouth every six (6) hours as needed for Cough. 1 Bottle 0  
 albuterol (PROVENTIL HFA, VENTOLIN HFA, PROAIR HFA) 90 mcg/actuation inhaler Take 2 Puffs by inhalation every four (4) hours as needed for Wheezing. 1 Inhaler 0 Past History Past Medical History: 
Past Medical History:  
Diagnosis Date  Bipolar 1 disorder ([facility] [address]  Cancer ([address]   
 laryngeal-3 years ago, in remission  Kidney stone  Psychiatric disorder ADHD  Trigeminal neuralgia Past Surgical History: 
History reviewed. No pertinent surgical history. Family History: 
History reviewed. No pertinent family history. Social History: 
Social History Tobacco Use  Smoking status: Current Every Day Smoker Packs/day: 0.50 Years: 5.00 Pack years: 2.50  Smokeless tobacco: Current User Substance Use Topics  Alcohol use: Yes Comment: occ  Drug use: No  
  Comment: hx of marijuana/lsd/hallucinogenics Allergies: Allergies Allergen Reactions  Penicillins Hives Denies allergy on 2/15/2019  Seroquel [Quetiapine] Rash Review of Systems Review of Systems Constitutional: Negative for chills and fever. HENT: Negative for congestion, rhinorrhea and sore throat. Respiratory: Negative for cough and shortness of breath. Cardiovascular: Negative for chest pain and palpitations. Gastrointestinal: Negative for diarrhea, nausea and vomiting. Musculoskeletal: Negative for neck pain and neck stiffness. Skin: Positive for wound. Negative for rash. Allergic/Immunologic: Negative for food allergies and immunocompromised state. Neurological: Negative for dizziness, weakness and headaches. Hematological: Negative for adenopathy. Does not bruise/bleed easily. Psychiatric/Behavioral: Negative for agitation and confusion. Physical Exam  
Physical Exam  
Constitutional: He is oriented to person, place, and time. He appears well-developed and well-nourished. No distress. WDWN male, alert, restraints noted, in police custody, not cooperative with physical exam due to reported tenderness HENT:  
Head: Normocephalic and atraumatic. Nose: Nose normal.  
Eyes: Conjunctivae and EOM are normal. Right eye exhibits no discharge. Left eye exhibits no discharge. No scleral icterus. Neck: Normal range of motion. Neck supple. No JVD present. No tracheal deviation present. No thyromegaly present. Cardiovascular: Regular rhythm and normal heart sounds. tachycardic Pulmonary/Chest: Effort normal and breath sounds normal. No respiratory distress. He has no wheezes. Abdominal: Soft. There is no tenderness. Musculoskeletal: He exhibits tenderness. He exhibits no edema. See SKIN exam  
Lymphadenopathy:  
  He has no cervical adenopathy. Neurological: He is alert and oriented to person, place, and time. He exhibits normal muscle tone. Coordination normal.  
Skin: Skin is warm and dry. He is not diaphoretic. There is erythema. L forearm with notable erythema, tenderness extending from antecubital fossa to wrist, difficult to appreciate fluctuance, region is firm, warm to touch. Well healed puncture wound noted proximally where patient reports he attempted to \"drain an abscess\" several days ago. No purulence noted. 2+ radial pulse. NVI. Sensation grossly intact to light touch. CR < 2.    
Psychiatric: He has a normal mood and affect. His behavior is normal. Judgment normal.  
Nursing note and vitals reviewed. Diagnostic Study Results Labs - No results found for this or any previous visit (from the past 12 hour(s)). Radiologic Studies -  
US EXT NONVAS LT LTD Final Result IMPRESSION:   
Complex subcutaneous fluid collection in the area of palpable concern of the  
left forearm concerning for abscess. XR CHEST PORT    (Results Pending) Medical Decision Making I am the first provider for this patient. I reviewed the vital signs, available nursing notes, past medical history, past surgical history, family history and social history. Vital Signs-Reviewed the patient's vital signs. Patient Vitals for the past 12 hrs: 
 Temp Pulse Resp BP SpO2  
05/19/19 2002 98.2 °F (36.8 °C) (!) 125 18 111/62 99 % Records Reviewed: Nursing Notes, Old Medical Records, Previous Radiology Studies and Previous Laboratory Studies Provider Notes (Medical Decision Making): Heroin abscess, cellulitis ED Course:  
Initial assessment performed. The patients presenting problems have been discussed, and they are in agreement with the care plan formulated and outlined with them. I have encouraged them to ask questions as they arise throughout their visit. Case d/w Dr. Mynor Kowalski who agreed with plan. Will obtain bedside US to r/o abscess. Given patient's tachycardia and concerns for infection, will initiate sepsis protocol. Case d/w US tech at bedside who confirms irregular appearing, complex fluid collection which would be difficult to adequately drain. Will treat for cellulitis and consult Hospitalist to evaluate for admission/tx. TOBACCO CESSATION COUNSELING The patient was counseled on the dangers of tobacco use, and was advised to quit. Reviewed strategies to maximize success, including removing cigarettes and smoking materials from environment, stress management, substitution of other forms of reinforcement, support of family/friends and written materials. CONSULT: HOSPITALIST Case d/w Dr. Tamie Burton who will evaluate patient for possible admission and/or general surgery consult if not responding to IV antibiotics. PLAN: 
1. Admit to hospitalist service (Dr. Tamie Burton) Diagnosis Clinical Impression: 1. Abscess of multiple sites 2. History of heroin use 3. Tobacco dependence

## 2019-05-20 NOTE — ED NOTES
Bedside and Verbal shift change report given to Wilferd Sibley RN (oncoming nurse) by Nicko Cedeno Rn (offgoing nurse). Report included the following information SBAR, Kardex, ED Summary and MAR.

## 2019-05-20 NOTE — ED NOTES
TRANSFER - OUT REPORT: 
 
Verbal report given to med tele RN (name) on Frank No  being transferred to med tele (unit) for routine progression of care Report consisted of patients Situation, Background, Assessment and  
Recommendations(SBAR). Information from the following report(s) SBAR, Kardex, ED Summary, Intake/Output, MAR, Recent Results and Cardiac Rhythm sinus tach was reviewed with the receiving nurse. Lines:  
Peripheral IV 05/19/19 Right Antecubital (Active) Site Assessment Clean, dry, & intact 5/19/2019  8:25 PM  
Phlebitis Assessment 0 5/19/2019  8:25 PM  
Infiltration Assessment 0 5/19/2019  8:25 PM  
Dressing Status Clean, dry, & intact 5/19/2019  8:25 PM  
Dressing Type Tape;Transparent 5/19/2019  8:25 PM  
Hub Color/Line Status Green;Positional;Flushed 5/19/2019  8:25 PM  
Action Taken Blood drawn 5/19/2019  8:25 PM  
  
 
Opportunity for questions and clarification was provided. Patient transported [facility]: 
 [facility]

## 2019-05-20 NOTE — PROGRESS NOTES
Patient up from surgery requesting \"more pain medication because toradol is not working\". MD Audra Jane called and in route to see patient. Dressing to left arm clean dry and intact.  at bedside. Family in room with patient. Room searched and no foreign objects found at this time. Care plan reviewed and patient expressed understanding. Call light and belongings within reach. Will continue to monitor. \"

## 2019-05-20 NOTE — PERIOP NOTES
TRANSFER - OUT REPORT: 
 
Verbal report given to Mary Ortega RN (name) on Linward Console  being transferred to  [phone] (unit) for routine progression of care Report consisted of patients Situation, Background, Assessment and  
Recommendations(SBAR). Information from the following report(s) SBAR, OR Summary, Intake/Output, MAR and Cardiac Rhythm NSR was reviewed with the receiving nurse. Opportunity for questions and clarification was provided. Patient transported with: 
 O2 @ 2 liters Registered Nurse Tech

## 2019-05-20 NOTE — CONSULTS
Surgery Consult    Subjective:      Herb Cantu is a 29 y.o. male brought to the ED by the [facility] after being handed over by [facility] as he has a warrant for his arrest.  He c/o severe left forearm pain, fevers and chills and inability to eat for the past 2 days. He has attempted to drain an abscess himself x 2 in the past 2 days. Pt currently an iv heroin user, admits to reusing dirty needles. Past Medical History:   Diagnosis Date    Bipolar 1 disorder ([facility]     Cancer ([facility]     laryngeal-3 years ago, in remission    Kidney stone     Psychiatric disorder     ADHD    Trigeminal neuralgia      History reviewed. No pertinent surgical history. History reviewed. No pertinent family history.   Social History     Socioeconomic History    Marital status: SINGLE     Spouse name: Not on file    Number of children: Not on file    Years of education: Not on file    Highest education level: Not on file   Tobacco Use    Smoking status: Current Every Day Smoker     Packs/day: 0.50     Years: 5.00     Pack years: 2.50    Smokeless tobacco: Current User   Substance and Sexual Activity    Alcohol use: Yes     Comment: occ    Drug use: No     Comment: hx of marijuana/lsd/hallucinogenics   Social History Narrative    ** Merged History Encounter **           Current Facility-Administered Medications   Medication Dose Route Frequency Provider Last Rate Last Dose    HYDROmorphone (PF) (DILAUDID) injection 0.5 mg  0.5 mg IntraVENous ONCE Sia Juarez MD        sodium chloride 0.9 % bolus infusion 244 mL  244 mL IntraVENous ONCE Hue Winkler MD        acetaminophen (TYLENOL) tablet 650 mg  650 mg Oral Q6H PRN Hue Winkler MD        sodium chloride (NS) flush 5-40 mL  5-40 mL IntraVENous Q8H Hue Winkler MD   10 mL at 05/19/19 2346    sodium chloride (NS) flush 5-40 mL  5-40 mL IntraVENous PRN Hue Winkler MD   10 mL at 05/20/19 0510    0.9% sodium chloride infusion  125 mL/hr IntraVENous CONTINUOUS Jonna Pike  mL/hr at 19 0007 125 mL/hr at 19 0007    ondansetron (ZOFRAN) injection 4 mg  4 mg IntraVENous Q4H PRN Jonna Pike MD   4 mg at 19 0509    nicotine (NICODERM CQ) 21 mg/24 hr patch 1 Patch  1 Patch TransDERmal Q24H Jonna Pike MD   1 Patch at 19 2344    ketorolac (TORADOL) injection 30 mg  30 mg IntraVENous Q6H PRN Jonna Pike MD   30 mg at 19 2344    piperacillin-tazobactam (ZOSYN) 3.375 g in 0.9% sodium chloride (MBP/ADV) 100 mL  3.375 g IntraVENous Q8H Jonna Pike MD 25 mL/hr at 19 0509 3.375 g at 19 0509    vancomycin (VANCOCIN) 1,000 mg in 0.9% sodium chloride (MBP/ADV) 250 mL  1,000 mg IntraVENous Q12H Jonna Pike MD            Allergies   Allergen Reactions    Penicillins Hives     Denies allergy on 2/15/2019    Seroquel [Quetiapine] Rash       Review of Systems:  Pertinent items are noted in the History of Present Illness. Objective:        Patient Vitals for the past 8 hrs:   BP Temp Pulse Resp SpO2   19 0743 125/82 97.9 °F (36.6 °C) 88 18 99 %   19 0506 118/86 98 °F (36.7 °C) 81 18 99 %       Temp (24hrs), Av.2 °F (36.8 °C), Min:97.9 °F (36.6 °C), Max:98.8 °F (37.1 °C)      Physical Exam:  GENERAL: alert, cooperative, mild distress, appears stated age, LUNG: clear to auscultation bilaterally, HEART: regular rate and rhythm, EXTREMITIES:  Left forearm with marked swelling and erythema 5-10 cm distal to antecubital fossa with blanching erythema. Open wound more proximal with scant purulent drainage. Abscess area demonstrates scoring of the skin where pt attempted to drain it himself without full thickness skin breakdown. No lymphangitis or lymphadenopathy. Marked tenderness to palpation.     Assessment:     Hospital Problems  Never Reviewed          Codes Class Noted POA    Sepsis ([facility] ICD-10-CM: A41.9  ICD-9-CM: 038.9, 995.91  5/19/2019 Unknown        Abscess of left upper extremity ICD-10-CM: L02.414  ICD-9-CM: 682.3  5/19/2019 Unknown              Plan:     Discussed the risk of surgery including bleeding, infection, need for packing and dressing changes, and the risks of general anesthetic. The patient understands the risks; any and all questions were answered to the patient's satisfaction.

## 2019-05-20 NOTE — ED NOTES
Bedside shift change report given to [address] [place] [address] Avenue (oncoming nurse) by Luisa Gary (offgoing nurse). Report included the following information SBAR, ED Summary, MAR and Recent Results.

## 2019-05-20 NOTE — ANESTHESIA PREPROCEDURE EVALUATION
Relevant Problems No relevant active problems Anesthetic History No history of anesthetic complications Review of Systems / Medical History Patient summary reviewed, nursing notes reviewed and pertinent labs reviewed Pulmonary Smoker Neuro/Psych Psychiatric history Cardiovascular Within defined limits Exercise tolerance: >4 METS 
  
GI/Hepatic/Renal 
Within defined limits Endo/Other Diabetes Cancer Other Findings Comments: Abscess, arm 
IV heroin user Physical Exam 
 
Airway Mallampati: II 
TM Distance: 4 - 6 cm Neck ROM: normal range of motion Mouth opening: Normal 
 
 Cardiovascular Regular rate and rhythm,  S1 and S2 normal,  no murmur, click, rub, or gallop Dental 
No notable dental hx Pulmonary Breath sounds clear to auscultation Abdominal 
GI exam deferred Other Findings Anesthetic Plan ASA: 3 Anesthesia type: general 
 
 
 
 
Induction: Intravenous Anesthetic plan and risks discussed with: Patient

## 2019-05-20 NOTE — PERIOP NOTES
Right and left radial pulse palp. Grasp strong and equal. Brisk cap refill pt states his left arm and hand are numb  abcess to left forearm noted.

## 2019-05-20 NOTE — ROUTINE PROCESS
sbar  In note pt to holding area identifies self and procedure for today. Consent signed in holding area pt spoke with dr. Grady Zhou. Pt has been npo

## 2019-05-21 LAB
ANION GAP SERPL CALC-SCNC: 4 MMOL/L (ref 5–15)
BASOPHILS # BLD: 0 K/UL (ref 0–0.1)
BASOPHILS NFR BLD: 0 % (ref 0–1)
BUN SERPL-MCNC: 12 MG/DL (ref 6–20)
BUN/CREAT SERPL: 13 (ref 12–20)
CALCIUM SERPL-MCNC: 9.2 MG/DL (ref 8.5–10.1)
CHLORIDE SERPL-SCNC: 107 MMOL/L (ref 97–108)
CO2 SERPL-SCNC: 27 MMOL/L (ref 21–32)
CREAT SERPL-MCNC: 0.94 MG/DL (ref 0.7–1.3)
DATE LAST DOSE: NORMAL
DIFFERENTIAL METHOD BLD: ABNORMAL
EOSINOPHIL # BLD: 0.1 K/UL (ref 0–0.4)
EOSINOPHIL NFR BLD: 1 % (ref 0–7)
ERYTHROCYTE [DISTWIDTH] IN BLOOD BY AUTOMATED COUNT: 12.5 % (ref 11.5–14.5)
EST. AVERAGE GLUCOSE BLD GHB EST-MCNC: 97 MG/DL
GLUCOSE SERPL-MCNC: 123 MG/DL (ref 65–100)
HBA1C MFR BLD: 5 % (ref 4.2–6.3)
HCT VFR BLD AUTO: 38.7 % (ref 36.6–50.3)
HGB BLD-MCNC: 12.6 G/DL (ref 12.1–17)
IMM GRANULOCYTES # BLD AUTO: 0.1 K/UL (ref 0–0.04)
IMM GRANULOCYTES NFR BLD AUTO: 1 % (ref 0–0.5)
LYMPHOCYTES # BLD: 2.2 K/UL (ref 0.8–3.5)
LYMPHOCYTES NFR BLD: 17 % (ref 12–49)
MCH RBC QN AUTO: 29 PG (ref 26–34)
MCHC RBC AUTO-ENTMCNC: 32.6 G/DL (ref 30–36.5)
MCV RBC AUTO: 89 FL (ref 80–99)
MONOCYTES # BLD: 1.2 K/UL (ref 0–1)
MONOCYTES NFR BLD: 9 % (ref 5–13)
NEUTS SEG # BLD: 9.3 K/UL (ref 1.8–8)
NEUTS SEG NFR BLD: 72 % (ref 32–75)
NRBC # BLD: 0 K/UL (ref 0–0.01)
NRBC BLD-RTO: 0 PER 100 WBC
PLATELET # BLD AUTO: 385 K/UL (ref 150–400)
PMV BLD AUTO: 8.9 FL (ref 8.9–12.9)
POTASSIUM SERPL-SCNC: 3.9 MMOL/L (ref 3.5–5.1)
RBC # BLD AUTO: 4.35 M/UL (ref 4.1–5.7)
REPORTED DOSE,DOSE: NORMAL UNITS
REPORTED DOSE/TIME,TMG: NORMAL
SODIUM SERPL-SCNC: 138 MMOL/L (ref 136–145)
VANCOMYCIN TROUGH SERPL-MCNC: 6 UG/ML (ref 5–10)
WBC # BLD AUTO: 12.9 K/UL (ref 4.1–11.1)

## 2019-05-21 PROCEDURE — 74011250636 HC RX REV CODE- 250/636: Performed by: SURGERY

## 2019-05-21 PROCEDURE — 74011250637 HC RX REV CODE- 250/637: Performed by: SURGERY

## 2019-05-21 PROCEDURE — 65270000029 HC RM PRIVATE

## 2019-05-21 PROCEDURE — 80048 BASIC METABOLIC PNL TOTAL CA: CPT

## 2019-05-21 PROCEDURE — 74011250636 HC RX REV CODE- 250/636: Performed by: INTERNAL MEDICINE

## 2019-05-21 PROCEDURE — 36415 COLL VENOUS BLD VENIPUNCTURE: CPT

## 2019-05-21 PROCEDURE — 74011000258 HC RX REV CODE- 258: Performed by: SURGERY

## 2019-05-21 PROCEDURE — 85025 COMPLETE CBC W/AUTO DIFF WBC: CPT

## 2019-05-21 PROCEDURE — 80202 ASSAY OF VANCOMYCIN: CPT

## 2019-05-21 PROCEDURE — 74011250637 HC RX REV CODE- 250/637: Performed by: INTERNAL MEDICINE

## 2019-05-21 RX ORDER — POLYETHYLENE GLYCOL 3350 17 G/17G
17 POWDER, FOR SOLUTION ORAL DAILY
Status: DISCONTINUED | OUTPATIENT
Start: 2019-05-21 | End: 2019-05-22 | Stop reason: HOSPADM

## 2019-05-21 RX ORDER — HYDROMORPHONE HYDROCHLORIDE 1 MG/ML
1 INJECTION, SOLUTION INTRAMUSCULAR; INTRAVENOUS; SUBCUTANEOUS ONCE
Status: COMPLETED | OUTPATIENT
Start: 2019-05-22 | End: 2019-05-21

## 2019-05-21 RX ORDER — DOCUSATE SODIUM 100 MG/1
100 CAPSULE, LIQUID FILLED ORAL 2 TIMES DAILY
Status: DISCONTINUED | OUTPATIENT
Start: 2019-05-21 | End: 2019-05-22 | Stop reason: HOSPADM

## 2019-05-21 RX ADMIN — OXYCODONE HYDROCHLORIDE 5 MG: 5 TABLET ORAL at 22:08

## 2019-05-21 RX ADMIN — OXYCODONE HYDROCHLORIDE 5 MG: 5 TABLET ORAL at 13:59

## 2019-05-21 RX ADMIN — ONDANSETRON 4 MG: 2 INJECTION INTRAMUSCULAR; INTRAVENOUS at 18:03

## 2019-05-21 RX ADMIN — ACETAMINOPHEN 650 MG: 325 TABLET ORAL at 20:27

## 2019-05-21 RX ADMIN — OXYCODONE HYDROCHLORIDE 5 MG: 5 TABLET ORAL at 10:02

## 2019-05-21 RX ADMIN — Medication 10 ML: at 11:13

## 2019-05-21 RX ADMIN — DOCUSATE SODIUM 100 MG: 100 CAPSULE, LIQUID FILLED ORAL at 17:59

## 2019-05-21 RX ADMIN — PIPERACILLIN AND TAZOBACTAM 3.38 G: 3; .375 INJECTION, POWDER, FOR SOLUTION INTRAVENOUS at 08:40

## 2019-05-21 RX ADMIN — PIPERACILLIN AND TAZOBACTAM 3.38 G: 3; .375 INJECTION, POWDER, FOR SOLUTION INTRAVENOUS at 00:45

## 2019-05-21 RX ADMIN — HYDROMORPHONE HYDROCHLORIDE 1 MG: 1 INJECTION, SOLUTION INTRAMUSCULAR; INTRAVENOUS; SUBCUTANEOUS at 23:38

## 2019-05-21 RX ADMIN — ONDANSETRON 4 MG: 2 INJECTION INTRAMUSCULAR; INTRAVENOUS at 08:57

## 2019-05-21 RX ADMIN — HYDROMORPHONE HYDROCHLORIDE 0.5 MG: 2 INJECTION, SOLUTION INTRAMUSCULAR; INTRAVENOUS; SUBCUTANEOUS at 18:54

## 2019-05-21 RX ADMIN — KETOROLAC TROMETHAMINE 30 MG: 30 INJECTION, SOLUTION INTRAMUSCULAR at 11:00

## 2019-05-21 RX ADMIN — SODIUM CHLORIDE 125 ML/HR: 900 INJECTION, SOLUTION INTRAVENOUS at 00:45

## 2019-05-21 RX ADMIN — PIPERACILLIN AND TAZOBACTAM 3.38 G: 3; .375 INJECTION, POWDER, FOR SOLUTION INTRAVENOUS at 16:13

## 2019-05-21 RX ADMIN — HEPARIN SODIUM 5000 UNITS: 5000 INJECTION INTRAVENOUS; SUBCUTANEOUS at 20:27

## 2019-05-21 RX ADMIN — KETOROLAC TROMETHAMINE 30 MG: 30 INJECTION, SOLUTION INTRAMUSCULAR at 04:26

## 2019-05-21 RX ADMIN — OXYCODONE HYDROCHLORIDE 5 MG: 5 TABLET ORAL at 17:57

## 2019-05-21 RX ADMIN — ACETAMINOPHEN 650 MG: 325 TABLET ORAL at 10:02

## 2019-05-21 RX ADMIN — OXYCODONE HYDROCHLORIDE 5 MG: 5 TABLET ORAL at 00:00

## 2019-05-21 RX ADMIN — HYDROMORPHONE HYDROCHLORIDE 0.5 MG: 2 INJECTION, SOLUTION INTRAMUSCULAR; INTRAVENOUS; SUBCUTANEOUS at 09:05

## 2019-05-21 RX ADMIN — DOCUSATE SODIUM 100 MG: 100 CAPSULE, LIQUID FILLED ORAL at 13:59

## 2019-05-21 RX ADMIN — Medication 10 ML: at 22:08

## 2019-05-21 RX ADMIN — KETOROLAC TROMETHAMINE 30 MG: 30 INJECTION, SOLUTION INTRAMUSCULAR at 17:57

## 2019-05-21 RX ADMIN — ONDANSETRON 4 MG: 2 INJECTION INTRAMUSCULAR; INTRAVENOUS at 22:09

## 2019-05-21 RX ADMIN — Medication 10 ML: at 05:57

## 2019-05-21 RX ADMIN — POLYETHYLENE GLYCOL 3350 17 G: 17 POWDER, FOR SOLUTION ORAL at 15:02

## 2019-05-21 RX ADMIN — VANCOMYCIN HYDROCHLORIDE 1000 MG: 1 INJECTION, POWDER, LYOPHILIZED, FOR SOLUTION INTRAVENOUS at 21:07

## 2019-05-21 RX ADMIN — OXYCODONE HYDROCHLORIDE 5 MG: 5 TABLET ORAL at 05:57

## 2019-05-21 RX ADMIN — VANCOMYCIN HYDROCHLORIDE 1000 MG: 1 INJECTION, POWDER, LYOPHILIZED, FOR SOLUTION INTRAVENOUS at 10:05

## 2019-05-21 RX ADMIN — ONDANSETRON 4 MG: 2 INJECTION INTRAMUSCULAR; INTRAVENOUS at 13:21

## 2019-05-21 NOTE — PROGRESS NOTES
Hospitalist Progress Note    NAME: Greg Cruz   :  1990   MRN:  [id number]       Assessment / Plan:  Sepsis with tachycardia and leukocytosis POA  due to complex abscess left upper extremity  S/p Incision and drainage of left forearm abscess with pulse irrigation on   Fu wound cx  c/w IV vanco and zosyn   No fever. Lactic acid normal   Adjust IVF  Pain Mx with IV toradol, Tylenol and PRN oxycodone     CAPRI  Resolved with IVF     Tobacco abuse / heroin abuse/IVDA   Nicotine patch  Educated on smocking cessation earlier admission  No sign symptoms of withdrawal till date     ADHD, not on meds currently   H/o laryngeal cancer in remission       Code Status: full code   Surrogate Decision Maker: father      DVT Prophylaxis:heparin   GI Prophylaxis: not indicated     Baseline: independent ; lives with his father     Body mass index is 23.01 kg/m². therefore classifying patient as normal wt      Subjective: Pt seen and examined at bedside. Complaining of left arm pain. Overnight events d/w RN     Chief Complaint / Reason for Physician Visit: f/u \"Sepsis and LUE abscess\"    Review of Systems:  Symptom Y/N Comments  Symptom Y/N Comments   Fever/Chills n   Chest Pain n    Poor Appetite    Edema     Cough n   Abdominal Pain n    Sputum    Joint Pain     SOB/RODAS n   Pruritis/Rash     Nausea/vomit n   Tolerating PT/OT     Diarrhea    Tolerating Diet y    Constipation    Other y Left arm pain     Could NOT obtain due to:      Objective:     VITALS:   Last 24hrs VS reviewed since prior progress note.  Most recent are:  Patient Vitals for the past 24 hrs:   Temp Pulse Resp BP SpO2   19 0749 97.4 °F (36.3 °C) (!) 104 20 135/81 100 %   19 0420 97.6 °F (36.4 °C) 82 17 132/86 100 %   19 2249 98 °F (36.7 °C) 88 18 142/72 98 %   19 1947 98.2 °F (36.8 °C) 90 18 119/74 99 %   19 1457 97.5 °F (36.4 °C) 92 18 129/87 100 %   19 1201 98.3 °F (36.8 °C) 85 18 124/80 100 %   19 1145  93 16 162/79 100 %   05/20/19 1130 98.4 °F (36.9 °C) 96 22 162/79 100 %   05/20/19 1125  98 16 160/82 100 %   05/20/19 1120  94 13 160/81 99 %   05/20/19 1115  92 15 157/72 100 %   05/20/19 1110  (!) 123 13 (!) 156/96 100 %   05/20/19 1108 98.4 °F (36.9 °C) 96 11 154/78 100 %   05/20/19 1010 98.3 °F (36.8 °C) 88 13 118/83        Intake/Output Summary (Last 24 hours) at 5/21/2019 0845  Last data filed at 5/21/2019 0705  Gross per 24 hour   Intake 5352.09 ml   Output 10 ml   Net 5342.09 ml        PHYSICAL EXAM:  General: WD, WN. Alert, cooperative, no acute distress    EENT:  EOMI. Anicteric sclerae. MMM  Resp:  CTA bilaterally, no wheezing or rales. No accessory muscle use  CV:  Regular  rhythm,  No edema  GI:  Soft, Non distended, Non tender.  +Bowel sounds  Neurologic:  Alert and oriented X 3, normal speech,   Psych:   Good insight. Not anxious nor agitated  Skin:  Left forearm dressing in place, No rashes. No jaundice    Reviewed most current lab test results and cultures  YES  Reviewed most current radiology test results   YES  Review and summation of old records today    NO  Reviewed patient's current orders and MAR    YES  PMH/SH reviewed - no change compared to H&P  ________________________________________________________________________  Care Plan discussed with:    Comments   Patient x    Family      RN x    Care Manager     Consultant                        Multidiciplinary team rounds were held today with , nursing, pharmacist and clinical coordinator. Patient's plan of care was discussed; medications were reviewed and discharge planning was addressed.      ________________________________________________________________________  Total NON critical care TIME: 30 Minutes    Total CRITICAL CARE TIME Spent:   Minutes non procedure based      Comments   >50% of visit spent in counseling and coordination of care x Chart review ________________________________________________________________________  Jessica Nazario MD     Procedures: see electronic medical records for all procedures/Xrays and details which were not copied into this note but were reviewed prior to creation of Plan. LABS:  I reviewed today's most current labs and imaging studies.   Pertinent labs include:  Recent Labs     05/21/19 0430 05/19/19 2023   WBC 12.9* 12.8*   HGB 12.6 15.4   HCT 38.7 47.8    406*     Recent Labs     05/21/19 0430 05/19/19 2023    137   K 3.9 3.9    101   CO2 27 30   * 64*   BUN 12 18   CREA 0.94 1.59*   CA 9.2 10.1       Signed: Jessica Nazario MD

## 2019-05-21 NOTE — PROGRESS NOTES
Pharmacy Automatic Renal Dosing Protocol - Antimicrobials    Indication for Antimicrobials: LUE complex abscess    Current Regimen of Each Antimicrobial:  Zosyn 3.375 gm IV q8hr (Start  - Day 2  Vancomycin  1500 mg IV x 1 dose, pharmacy to dose (Start Date ; Day # 2)    Previous Antimicrobial Therapy:  Ceftriaxone 1 gm IV x 1 dose (Start Date ; Day  Bactrim DS 1 tab x 1 dose (Start Date ; Day    Goal Level: VANCOMYCIN TROUGH GOAL RANGE    Vancomycin Trough: 15 - 20 mcg/mL    Date Dose & Interval Measured (mcg/mL) Extrapolated (mcg/mL)   Phill@[employer] 1g q12h 6 6                 Date & time of next level:     Significant Cultures:    blood x 2 : NG - Prelim   Wound - APPARENT TINY GRAM POSITIVE COCCI IN PAIRS - Prelim   Anaerobic - Prelim    Radiology / Imaging results: (X-ray, CT scan or MRI):     Paralysis, amputations, malnutrition: not noted    Labs:  Recent Labs     19  0430 19   CREA 0.94 1.59*   BUN 12 18   WBC 12.9* 12.8*     Temp (24hrs), Av.8 °F (36.6 °C), Min:97.4 °F (36.3 °C), Max:98.2 °F (36.8 °C)    Creatinine Clearance (mL/min) or Dialysis: 73.2 ml/min    Impression/Plan:   Continue Zosyn 3.375 gm IV q8hr  SCr significantly improved  Trough resulted 6, sub therapeutic, trough drawn prior to ss, however considering improvement in renal function, will adjust dose to vancomycin 1g IV q8h to maintain trough ~15  Antimicrobial stop date TBD     Pharmacy will follow daily and adjust medications as appropriate for renal function and/or serum levels. Thank you,  Deja Hargrove II, PHARMD    Recommended duration of therapy  [email or web address] [email or web address]/[facility]/Pharmacy/Clinical%20Companion/[email or web address]. docx    Renal Dosing  [email or web address]/Health system/[place]/[facility]/Pharmacy/Clinical%20Companion/Renal%[id number]. pdf

## 2019-05-21 NOTE — PROGRESS NOTES
Admit Date: 2019    POD 1 Day Post-Op    Procedure:  Procedure(s):  INCISION AND DRAINAGE LEFT  FOREARM ABSCESS    Subjective:     Patient has no new complaints. Objective:     Blood pressure 135/81, pulse (!) 104, temperature 97.4 °F (36.3 °C), resp. rate 20, height 5' 11\" (1.803 m), weight 165 lb (74.8 kg), SpO2 100 %.     Temp (24hrs), Av °F (36.7 °C), Min:97.4 °F (36.3 °C), Max:98.4 °F (36.9 °C)      Physical Exam:  GENERAL: alert, cooperative, no distress, appears stated age, EXTREMITIES:  Wound clean, packing changed, scant drainage, cellulitis improved    Labs:   Recent Results (from the past 24 hour(s))   CULTURE, WOUND W GRAM STAIN    Collection Time: 19 10:53 AM   Result Value Ref Range    Special Requests: NO SPECIAL REQUESTS      GRAM STAIN FEW WBCS SEEN      GRAM STAIN        OCCASIONAL APPARENT TINY GRAM POSITIVE COCCI IN PAIRS    Culture result: PENDING    METABOLIC PANEL, BASIC    Collection Time: 19  4:30 AM   Result Value Ref Range    Sodium 138 136 - 145 mmol/L    Potassium 3.9 3.5 - 5.1 mmol/L    Chloride 107 97 - 108 mmol/L    CO2 27 21 - 32 mmol/L    Anion gap 4 (L) 5 - 15 mmol/L    Glucose 123 (H) 65 - 100 mg/dL    BUN 12 6 - 20 MG/DL    Creatinine 0.94 0.70 - 1.30 MG/DL    BUN/Creatinine ratio 13 12 - 20      GFR est AA >60 >60 ml/min/1.73m2    GFR est non-AA >60 >60 ml/min/1.73m2    Calcium 9.2 8.5 - 10.1 MG/DL   CBC WITH AUTOMATED DIFF    Collection Time: 19  4:30 AM   Result Value Ref Range    WBC 12.9 (H) 4.1 - 11.1 K/uL    RBC 4.35 4.10 - 5.70 M/uL    HGB 12.6 12.1 - 17.0 g/dL    HCT 38.7 36.6 - 50.3 %    MCV 89.0 80.0 - 99.0 FL    MCH 29.0 26.0 - 34.0 PG    MCHC 32.6 30.0 - 36.5 g/dL    RDW 12.5 11.5 - 14.5 %    PLATELET 044 626 - 178 K/uL    MPV 8.9 8.9 - 12.9 FL    NRBC 0.0 0  WBC    ABSOLUTE NRBC 0.00 0.00 - 0.01 K/uL    NEUTROPHILS 72 32 - 75 %    LYMPHOCYTES 17 12 - 49 %    MONOCYTES 9 5 - 13 %    EOSINOPHILS 1 0 - 7 %    BASOPHILS 0 0 - 1 % IMMATURE GRANULOCYTES 1 (H) 0.0 - 0.5 %    ABS. NEUTROPHILS 9.3 (H) 1.8 - 8.0 K/UL    ABS. LYMPHOCYTES 2.2 0.8 - 3.5 K/UL    ABS. MONOCYTES 1.2 (H) 0.0 - 1.0 K/UL    ABS. EOSINOPHILS 0.1 0.0 - 0.4 K/UL    ABS. BASOPHILS 0.0 0.0 - 0.1 K/UL    ABS. IMM. GRANS. 0.1 (H) 0.00 - 0.04 K/UL    DF AUTOMATED         Data Review images and reports reviewed    Assessment:     Active Problems:    Sepsis ([facility] [address] (5/19/2019)      Abscess of left upper extremity (5/19/2019)        Plan/Recommendations/Medical Decision Making:     S/p cutaneous abscess I&D related to IV drug abuse. Gram stain shows gram + diplococci, on Vanc pending culture results. Bid dressing changes wet to dry for now. Suitable for d/c to [facility] when okay with primary team.    Teri Quick.  Jyotsna Sanabria MD, [facility]

## 2019-05-21 NOTE — ROUTINE PROCESS
General Surgery End of Shift Nursing Note Bedside shift change report given to Lewis Irby RN (oncoming nurse) by David Eric RN (offgoing nurse). Report included the following information SBAR, Kardex, Intake/Output and MAR. Shift worked:   C Summary of shift:    Pt with pain in arm. Dressing change complete x2. Order received for stool softener for pt. Issues for physician to address:     
 
Number times ambulated in hallway past shift: 0 Number of times OOB to chair past shift: 0 Pain Management: 
Current medication:  
Patient states pain is manageable on current pain medication: NO 
 
GI: 
 
Current diet:  DIET REGULAR Tolerating current diet: YES Passing flatus: YES Last Bowel Movement: several days ago Appearance:  
 
Respiratory: 
 
Incentive Spirometer at bedside: YES Patient instructed on use: YES Patient Safety: 
 
 
Bed Alarm On? No 
Sitter?  No 
 
Geovanny Rubin RN

## 2019-05-21 NOTE — PROGRESS NOTES
Nutrition Assessment:    RECOMMENDATIONS:   Continue Regular diet as tolerated    DIETITIANS INTERVENTIONS/PLAN:   Continue diet  Monitor appetite/PO intake    ASSESSMENT:   Pt admitted with LUE abscess and sepsis. PMH: laryngeal CA, drug abuse. Chart reviewed. Pt triggered for MST (wt loss and poor appetite). Per H&P pt was unable to eat x 2 days PTA. He is s/p I&D of forearm abscess. Pt is on a regular diet and has a good appetite now. Labs reviewed, WNL. SUBJECTIVE/OBJECTIVE:     Diet Order: Regular  % Eaten:    Patient Vitals for the past 72 hrs:   % Diet Eaten   05/21/19 1256 100 %   05/21/19 0900 75 %     Pertinent Medications:colace, zosyn, miralax, vancomycin; Lorenzo@[employer]). Chemistries:  Lab Results   Component Value Date/Time    Sodium 138 05/21/2019 04:30 AM    Potassium 3.9 05/21/2019 04:30 AM    Chloride 107 05/21/2019 04:30 AM    CO2 27 05/21/2019 04:30 AM    Anion gap 4 (L) 05/21/2019 04:30 AM    Glucose 123 (H) 05/21/2019 04:30 AM    BUN 12 05/21/2019 04:30 AM    Creatinine 0.94 05/21/2019 04:30 AM    BUN/Creatinine ratio 13 05/21/2019 04:30 AM    GFR est AA >60 05/21/2019 04:30 AM    GFR est non-AA >60 05/21/2019 04:30 AM    Calcium 9.2 05/21/2019 04:30 AM    AST (SGOT) 19 09/09/2016 12:21 PM    Alk. phosphatase 93 09/09/2016 12:21 PM    Protein, total 7.8 09/09/2016 12:21 PM    Albumin 3.9 09/09/2016 12:21 PM    Globulin 3.9 09/09/2016 12:21 PM    A-G Ratio 1.0 (L) 09/09/2016 12:21 PM    ALT (SGPT) 42 09/09/2016 12:21 PM      Anthropometrics: Height: 5' 11\" (180.3 cm) Weight: 74.8 kg (165 lb)  []bed scale    [x]stated   []unknown    IBW (%IBW):   ( ) UBW (%UBW):   (  %)    BMI: Body mass index is 23.01 kg/m². This BMI is indicative of:  []Underweight   [x]Normal   []Overweight   [] Obesity   [] Extreme Obesity (BMI>40)  Estimated Nutrition Needs (Based on): 2262 Kcals/day(MSJ 1740 x 1.3) , 75 g(1gPro/kg) Protein  Carbohydrate:  At Least 130 g/day  Fluids: 2200 mL/day    Last BM: 5/19   []Active     []Hyperactive  [x]Hypoactive       [] Absent   BS  Skin:    [] Intact   [x] Incision  [] Breakdown   [] DTI   [] Tears/Excoriation/Abrasion  []Edema [] Other: Wt Readings from Last 30 Encounters:   05/19/19 74.8 kg (165 lb)   02/15/19 87.5 kg (193 lb)   09/14/16 88 kg (194 lb)   09/09/16 79.7 kg (175 lb 12.8 oz)   05/26/16 82.1 kg (181 lb)   04/21/16 79.4 kg (175 lb)   02/04/16 85.3 kg (188 lb)   12/22/15 86.2 kg (190 lb)   09/03/15 81.1 kg (178 lb 14.4 oz)   08/28/15 81.8 kg (180 lb 5.4 oz)   06/11/15 83 kg (182 lb 15.7 oz)   06/01/15 82.7 kg (182 lb 4.8 oz)   05/19/15 82.8 kg (182 lb 8 oz)   03/11/15 84.4 kg (186 lb)   02/01/15 82.6 kg (182 lb 1.6 oz)   01/06/15 83.5 kg (184 lb)   12/21/14 81.5 kg (179 lb 9.6 oz)   12/08/14 83.8 kg (184 lb 11.9 oz)   12/04/14 81.2 kg (179 lb 0.2 oz)   11/07/14 81.6 kg (180 lb)   09/30/14 80 kg (176 lb 5.9 oz)   08/24/14 81.5 kg (179 lb 10.8 oz)   06/20/14 79.4 kg (175 lb)   05/08/14 77.6 kg (171 lb)   12/05/13 77.1 kg (170 lb)   11/18/13 74.8 kg (165 lb)   09/30/13 77.1 kg (170 lb)   09/16/13 83.9 kg (185 lb)   09/15/13 79.4 kg (175 lb)   09/03/13 83.9 kg (185 lb)      NUTRITION DIAGNOSES:   Problem:  No nutritional diagnosis at this time    NUTRITION INTERVENTIONS:  Meals/Snacks: General/healthful diet                  GOAL:   Pt will consume >70% of meals in 4-6 days.      Cultural, Congregation, or Ethnic Dietary Needs: None     LEARNING NEEDS (Diet, Food/Nutrient-Drug Interaction):    [x] None Identified   [] Identified and Education Provided/Documented   [] Identified and Pt declined/was not appropriate      [x] Interdisciplinary Care Plan Reviewed/Documented    [x] Participated in Discharge Planning: Regular diet   [] Interdisciplinary Rounds     NUTRITION RISK:    [] High              [] Moderate           [x]  Low  [x]  Minimal/Uncompromised    PT SEEN FOR:    []  MD Consult: []Calorie Count      []Diabetic Diet Education        []Diet Education     []Electrolyte Management     []General Nutrition Management and Supplements     []Management of Tube Feeding     []TPN Recommendations    [x]  RN Referral:  [x]MST score >=2     []Enteral/Parenteral Nutrition PTA     []Pregnant: Gestational DM or Multigestation   []  Low BMI  []  Re-Screen   []  LOS   []  NPO/clears x 5 days   []  New TF/TPN    Fabian Jaime RD, [address]   Pager [phone]  Weekend Pager [phone]

## 2019-05-21 NOTE — PROGRESS NOTES
Problem: Falls - Risk of  Goal: *Absence of Falls  Description  Document Rui Jones Fall Risk and appropriate interventions in the flowsheet.   Outcome: Progressing Towards Goal     Problem: Pain  Goal: *Control of Pain  Outcome: Progressing Towards Goal

## 2019-05-21 NOTE — PROGRESS NOTES
Report called to Hilda MENEZES on gen surg. Patient being moved to room 2113. Order per administration for patient to be moved. Report given, no questions or concerns from receiving nurse.

## 2019-05-22 VITALS
HEART RATE: 71 BPM | DIASTOLIC BLOOD PRESSURE: 78 MMHG | SYSTOLIC BLOOD PRESSURE: 121 MMHG | WEIGHT: 165 LBS | RESPIRATION RATE: 19 BRPM | OXYGEN SATURATION: 99 % | TEMPERATURE: 97.9 F | HEIGHT: 71 IN | BODY MASS INDEX: 23.1 KG/M2

## 2019-05-22 LAB
ANION GAP SERPL CALC-SCNC: 5 MMOL/L (ref 5–15)
BACTERIA SPEC CULT: ABNORMAL
BACTERIA SPEC CULT: NORMAL
BUN SERPL-MCNC: 8 MG/DL (ref 6–20)
BUN/CREAT SERPL: 7 (ref 12–20)
CALCIUM SERPL-MCNC: 9.7 MG/DL (ref 8.5–10.1)
CHLORIDE SERPL-SCNC: 105 MMOL/L (ref 97–108)
CO2 SERPL-SCNC: 29 MMOL/L (ref 21–32)
CREAT SERPL-MCNC: 1.12 MG/DL (ref 0.7–1.3)
GLUCOSE SERPL-MCNC: 101 MG/DL (ref 65–100)
GRAM STN SPEC: ABNORMAL
GRAM STN SPEC: ABNORMAL
POTASSIUM SERPL-SCNC: 3.7 MMOL/L (ref 3.5–5.1)
SERVICE CMNT-IMP: ABNORMAL
SERVICE CMNT-IMP: NORMAL
SODIUM SERPL-SCNC: 139 MMOL/L (ref 136–145)

## 2019-05-22 PROCEDURE — 74011250636 HC RX REV CODE- 250/636: Performed by: SURGERY

## 2019-05-22 PROCEDURE — 80048 BASIC METABOLIC PNL TOTAL CA: CPT

## 2019-05-22 PROCEDURE — 36415 COLL VENOUS BLD VENIPUNCTURE: CPT

## 2019-05-22 PROCEDURE — 74011250636 HC RX REV CODE- 250/636: Performed by: INTERNAL MEDICINE

## 2019-05-22 PROCEDURE — 74011250637 HC RX REV CODE- 250/637: Performed by: SURGERY

## 2019-05-22 PROCEDURE — 74011000258 HC RX REV CODE- 258: Performed by: SURGERY

## 2019-05-22 PROCEDURE — 74011250637 HC RX REV CODE- 250/637: Performed by: INTERNAL MEDICINE

## 2019-05-22 RX ORDER — ACETAMINOPHEN 500 MG
500 TABLET ORAL
Qty: 30 TAB | Refills: 0 | OUTPATIENT
Start: 2019-05-22 | End: 2022-03-27

## 2019-05-22 RX ORDER — AMOXICILLIN AND CLAVULANATE POTASSIUM 875; 125 MG/1; MG/1
1 TABLET, FILM COATED ORAL EVERY 12 HOURS
Qty: 14 TAB | Refills: 0 | Status: SHIPPED | OUTPATIENT
Start: 2019-05-22 | End: 2019-05-29

## 2019-05-22 RX ORDER — IBUPROFEN 400 MG/1
400 TABLET ORAL
Qty: 30 TAB | Refills: 0 | OUTPATIENT
Start: 2019-05-22 | End: 2022-03-27

## 2019-05-22 RX ORDER — CLINDAMYCIN HYDROCHLORIDE 300 MG/1
300 CAPSULE ORAL 3 TIMES DAILY
Qty: 21 CAP | Refills: 0 | Status: SHIPPED | OUTPATIENT
Start: 2019-05-22 | End: 2019-05-22

## 2019-05-22 RX ADMIN — POLYETHYLENE GLYCOL 3350 17 G: 17 POWDER, FOR SOLUTION ORAL at 07:54

## 2019-05-22 RX ADMIN — DOCUSATE SODIUM 100 MG: 100 CAPSULE, LIQUID FILLED ORAL at 07:55

## 2019-05-22 RX ADMIN — OXYCODONE HYDROCHLORIDE 5 MG: 5 TABLET ORAL at 08:35

## 2019-05-22 RX ADMIN — ACETAMINOPHEN 650 MG: 325 TABLET ORAL at 04:27

## 2019-05-22 RX ADMIN — OXYCODONE HYDROCHLORIDE 5 MG: 5 TABLET ORAL at 02:06

## 2019-05-22 RX ADMIN — PIPERACILLIN AND TAZOBACTAM 3.38 G: 3; .375 INJECTION, POWDER, FOR SOLUTION INTRAVENOUS at 00:37

## 2019-05-22 RX ADMIN — VANCOMYCIN HYDROCHLORIDE 1000 MG: 1 INJECTION, POWDER, LYOPHILIZED, FOR SOLUTION INTRAVENOUS at 04:30

## 2019-05-22 RX ADMIN — KETOROLAC TROMETHAMINE 30 MG: 30 INJECTION, SOLUTION INTRAMUSCULAR at 08:35

## 2019-05-22 RX ADMIN — PIPERACILLIN AND TAZOBACTAM 3.38 G: 3; .375 INJECTION, POWDER, FOR SOLUTION INTRAVENOUS at 07:53

## 2019-05-22 RX ADMIN — ONDANSETRON 4 MG: 2 INJECTION INTRAMUSCULAR; INTRAVENOUS at 08:04

## 2019-05-22 RX ADMIN — Medication 10 ML: at 05:58

## 2019-05-22 RX ADMIN — HYDROMORPHONE HYDROCHLORIDE 0.5 MG: 2 INJECTION, SOLUTION INTRAMUSCULAR; INTRAVENOUS; SUBCUTANEOUS at 07:54

## 2019-05-22 RX ADMIN — KETOROLAC TROMETHAMINE 30 MG: 30 INJECTION, SOLUTION INTRAMUSCULAR at 00:38

## 2019-05-22 NOTE — PROGRESS NOTES
General Surgery End of Shift Nursing Note    Bedside shift change report given to [address] [facility] 82,Joni 100 (oncoming nurse) by Sukumar Pal RN (offgoing nurse). Report included the following information SBAR, Kardex, Intake/Output, MAR and Recent Results. Shift worked:   7p-7a   Summary of shift:    Patient dressing became saturated MD notified 1x dose of Diluadid 1mg given for wound care. At 0100 patient complained of Numbness in Left arm Hospitalist notified no new orders received. Labs attempted to be drawn by 2 nurses unable to draw labs patient refused any further attempts. Issues for physician to address:        Number times ambulated in hallway past shift: 0    Number of times OOB to chair past shift: 0    Pain Management:  Current medication: Tylenol, Diluadid, Oxycodone, Toradol  Patient states pain is manageable on current pain medication: NO    GI:    Current diet:  DIET REGULAR    Tolerating current diet: YES  Last Bowel Movement: several days ago           Patient Safety:    Falls Score: 1  Bed Alarm On? No  Sitter?  No    Hilda Nolasco

## 2019-05-22 NOTE — PROGRESS NOTES
Patient discharge instructions given to guard, and prescriptions reviewed at this time. IV and fluids discontinued at this time with cath tip intact. The patient requested that he stay until the next dose of oxycodone is able to be given. The captain arrived and said it was not medically necessary at this time. Discharge commenced. Transport for the patient arrived and awaiting security arrival to get the patient discharge.

## 2019-05-22 NOTE — ROUTINE PROCESS
Notified that Pt is having numbness in forearm at surgical site. Recommended to nursing to have Pt discuss with surgeon in AM.

## 2019-05-22 NOTE — PROGRESS NOTES
The patient escorted by security X2 staff and guards x2. Ankle shackles present. The patient escorted with wheelchair, and all discharge instructions and prescriptions.

## 2019-05-22 NOTE — PROGRESS NOTES
The patient required dressing change to left arm. Scheduled Dilaudid given at 0755. Dressing changed at 0810-per his request.  The patient tolerated fairly, stating the katja wrap gauze was wrapped too tight. After finishing with dressing change, got a finger between the katja wrap and the back of his forearm to loosen, the pain stopping him from being able to tell me if it felt better being looser. 0945-The patient called to have the katja re wrapped as it started to slide down his arm while sleeping-The packed moist gauze did not move-so wasn't affected. 0946-Re-wrapped the katja with fresh gauze and fresh ABD- and gently replacing tape. Taped the top of the wrap to ensure it would stay in place. Will continue to monitor at this time.

## 2019-05-22 NOTE — DISCHARGE INSTRUCTIONS
HOSPITALIST DISCHARGE INSTRUCTIONS    NAME: Bernarda Frausto   :  1990   MRN:  [id number]     Date/Time:  2019 11:21 AM    ADMIT DATE: 2019     DISCHARGE DATE: 2019     DISCHARGE DIAGNOSIS:  Sepsis   Complex abscess left upper extremity  S/p Incision and drainage of left forearm abscess with pulse irrigation on   CAPRI  Tobacco abuse / heroin abuse/IVDA   ADHD  H/o laryngeal cancer in remission     MEDICATIONS:  · It is important that you take the medication exactly as they are prescribed. · Keep your medication in the bottles provided by the pharmacist and keep a list of the medication names, dosages, and times to be taken in your wallet. · Do not take other medications without consulting your doctor. Pain Management: per above medications    What to do at Home    Recommended diet:  Regular Diet    Recommended activity: Activity as tolerated    Wound Care: Pack right forearm wound twice daily with saline moistened gauze    If you have questions regarding the hospital related prescriptions or hospital related issues please call [facility] wilfrido Jarvis at . If you experience any of the following symptoms then please call your primary care physician or return to the emergency room if you cannot get hold of your doctor:  Fever, chills, nausea, vomiting, diarrhea, change in mentation, falling, bleeding, shortness of breath        Information obtained by :  I understand that if any problems occur once I am at home I am to contact my physician. I understand and acknowledge receipt of the instructions indicated above.                                                                                                                                            Physician's or R.N.'s Signature                                                                  Date/Time Patient or Representative Signature                                                          Date/Time

## 2019-05-22 NOTE — DISCHARGE SUMMARY
Hospitalist Discharge Summary     Patient ID:  Janet Reyes  [id number]  74 y.o.  1990 5/19/2019    PCP on record: Odalis Herrera MD    Admit date: 5/19/2019  Discharge date and time: 5/22/2019    DISCHARGE DIAGNOSIS:  Sepsis   Complex abscess left upper extremity  S/p Incision and drainage of left forearm abscess with pulse irrigation on 05/20  CAPRI  Tobacco abuse / heroin abuse/IVDA   ADHD  H/o laryngeal cancer in remission    CONSULTATIONS:  IP CONSULT TO GENERAL SURGERY    Excerpted HPI from H&P of Dexter Michael MD:  Janet Reyes is a 29 y.o.  male who presents with above complaint. Pt admits to using dirty needles for injecting drugs. He started with Left arm pain/swelling/erythema ~ 2 days ago. Sx was getting progressively worse. He attempted to merlene an abscess by himself without any result. He noticed moderate purulent drainage. He denies fever/chills. He admits to poor appetite and poor po intake in the last 2 days. [facility].  Pt was found in [facility] by [employer] for a call for service.  Pt was handed off to [facility], as he had a warrant out for his arrest.  In the middle of transfer, pt started complaining of weeping, purulent abscess to L arm and was brought to ED.      Vs: 98.2 °F (36.8 °C) - 125 - 111/62 - 18 - 99% RA      We were asked to admit for work up and evaluation of the above problems. ______________________________________________________________________  DISCHARGE SUMMARY/HOSPITAL COURSE:  for full details see H&P, daily progress notes, labs, consult notes.      Sepsis with tachycardia and leukocytosis POA  due to complex abscess left upper extremity  S/p Incision and drainage of left forearm abscess with pulse irrigation on 05/20  Wound Cultures with microaerophilic streptococcus  c/w IV vanco and zosyn, transition to PO augmentin to complete coarse of abx  No fever. Lactic acid normal   S/p IVF  Pain Mx with motrin and tylenol     CAPRI  Resolved with IVF     Tobacco abuse / heroin abuse/IVDA   Nicotine patch  Educated on smocking cessation earlier admission  No sign symptoms of withdrawal till date     ADHD, not on meds currently   H/o laryngeal cancer in remission   _______________________________________________________________________  Patient seen and examined by me on discharge day. Pertinent Findings:  Gen:    Not in distress  Chest: Clear lungs  CVS:   Regular rhythm. No edema  Abd:  Soft, not distended, not tender  Neuro:  Alert, non focal  ______________________  Current Discharge Medication List      START taking these medications    Details   acetaminophen (TYLENOL) 500 mg tablet Take 1 Tab by mouth every six (6) hours as needed for Pain. Qty: 30 Tab, Refills: 0      ibuprofen (MOTRIN) 400 mg tablet Take 1 Tab by mouth every eight (8) hours as needed for Pain. Qty: 30 Tab, Refills: 0      amoxicillin-clavulanate (AUGMENTIN) 875-125 mg per tablet Take 1 Tab by mouth every twelve (12) hours for 7 days. Qty: 14 Tab, Refills: 0         CONTINUE these medications which have NOT CHANGED    Details   dextromethorphan-guaiFENesin (ROBITUSSIN-DM)  mg/5 mL syrup Take 10 mL by mouth every six (6) hours as needed for Cough. Qty: 1 Bottle, Refills: 0      albuterol (PROVENTIL HFA, VENTOLIN HFA, PROAIR HFA) 90 mcg/actuation inhaler Take 2 Puffs by inhalation every four (4) hours as needed for Wheezing. Qty: 1 Inhaler, Refills: 0           Patient Follow Up Instructions:    Activity: Activity as tolerated  Diet: Regular Diet  Wound Care: As directed    Follow-up Information     Follow up With Specialties Details Why Contact Info    Jayme Espinoza MD Internal Medicine   [id number] [address]  [phone]      Wound Care    Pack right forearm wound twice daily with saline moistened gauze ________________________________________________________________    Risk of deterioration: Low    Condition at Discharge:  Stable  __________________________________________________________________    [facility]    ____________________________________________________________________    Code Status: Full Code  ___________________________________________________________________      Total time in minutes spent coordinating this discharge (includes going over instructions, follow-up, prescriptions, and preparing report for sign off to her PCP) :  35 minutes    Signed:  Davidson Merchant MD

## 2019-05-22 NOTE — PROGRESS NOTES
Reason for Admission:   Pt is a 30 yo male admitted from [address] [place] [address] [address] custody for evaluation/treatment of abscess of L upper arm. Pt underwent I and D with cultures taken - grew heavy microaerophilic strep. RRAT Score:      4               Plan for utilizing home health:      N/A                    Current Advanced Directive/Advance Care Plan: Not on FIle    Likelihood of Readmission:  Low                         Transition of Care Plan:    Pt is medically stable for dc to [facility] today - discharged on PO augmentin with instructions for BID wound packing until wound closed. Pt accompanied by 2 [facility] deputies. Pt was interviewed by Med Assist who will initiate Medicaid application. Care Management Interventions  PCP Verified by CM: No  Palliative Care Criteria Met (RRAT>21 & CHF Dx)?: No  Mode of Transport at Discharge:  Other (see comment)([email or web address]  )  Transition of Care Consult (CM Consult): Discharge Planning(Pt was assessed for possible dc needs.)  Discharge Durable Medical Equipment: No  Physical Therapy Consult: No  Occupational Therapy Consult: No  Speech Therapy Consult: No  Current Support Network: [facility]  Confirm Follow Up Transport: Other (see comment)  Freedom of Choice Offered: Yes  Discharge Location  Discharge Placement: Law Enforcement Custody     MARIN Lyn

## 2019-05-22 NOTE — PROGRESS NOTES
0755-Called PICC nurse to see if they could draw his AM labs-(BMP). Voicemail left. 0940-2nd attempt to call PICC nurse to hopefully draw AM labs(BMP), left voicemail.

## 2019-05-22 NOTE — PROGRESS NOTES
Surgery      Cultures growing heavy microaerophilic strep. Okay from surgical standpoint to change to po antibiotics and continue bid packing until wound closed. Suitable for d/c to snf with f/u in [facility] at this time. Cayden Pérez.  Jesi Ventura MD, [facility]

## 2019-05-25 LAB
BACTERIA SPEC CULT: NORMAL
BACTERIA SPEC CULT: NORMAL
SERVICE CMNT-IMP: NORMAL
SERVICE CMNT-IMP: NORMAL

## 2022-03-18 PROBLEM — L02.414 ABSCESS OF LEFT UPPER EXTREMITY: Status: ACTIVE | Noted: 2019-05-19

## 2022-03-19 PROBLEM — A41.9 SEPSIS (HCC): Status: ACTIVE | Noted: 2019-05-19

## 2022-03-27 ENCOUNTER — HOSPITAL ENCOUNTER (EMERGENCY)
Age: 32
Discharge: HOME | End: 2022-03-27
Attending: EMERGENCY MEDICINE
Payer: COMMERCIAL

## 2022-03-27 ENCOUNTER — APPOINTMENT (OUTPATIENT)
Dept: GENERAL RADIOLOGY | Age: 32
End: 2022-03-27
Attending: EMERGENCY MEDICINE
Payer: COMMERCIAL

## 2022-03-27 VITALS
WEIGHT: 172.84 LBS | HEIGHT: 71 IN | RESPIRATION RATE: 16 BRPM | OXYGEN SATURATION: 99 % | DIASTOLIC BLOOD PRESSURE: 76 MMHG | BODY MASS INDEX: 24.2 KG/M2 | TEMPERATURE: 98.6 F | SYSTOLIC BLOOD PRESSURE: 124 MMHG | HEART RATE: 98 BPM

## 2022-03-27 DIAGNOSIS — Z20.822 PERSON UNDER INVESTIGATION FOR COVID-19: ICD-10-CM

## 2022-03-27 DIAGNOSIS — F17.200 SMOKING ADDICTION: ICD-10-CM

## 2022-03-27 DIAGNOSIS — J20.9 ACUTE BRONCHITIS, UNSPECIFIED ORGANISM: Primary | ICD-10-CM

## 2022-03-27 PROCEDURE — U0005 INFEC AGEN DETEC AMPLI PROBE: HCPCS

## 2022-03-27 PROCEDURE — 71046 X-RAY EXAM CHEST 2 VIEWS: CPT

## 2022-03-27 PROCEDURE — 99284 EMERGENCY DEPT VISIT MOD MDM: CPT

## 2022-03-27 RX ORDER — ALBUTEROL SULFATE 90 UG/1
2 AEROSOL, METERED RESPIRATORY (INHALATION)
Qty: 1 EACH | Refills: 0 | Status: SHIPPED | OUTPATIENT
Start: 2022-03-27 | End: 2022-05-30

## 2022-03-27 RX ORDER — PREDNISONE 10 MG/1
TABLET ORAL
Qty: 21 TABLET | Refills: 0 | Status: SHIPPED | OUTPATIENT
Start: 2022-03-27 | End: 2022-05-30

## 2022-03-27 RX ORDER — DEXTROMETHORPHAN HYDROBROMIDE, GUAIFENESIN 20; 400 MG/20ML; MG/20ML
10 SOLUTION ORAL
Qty: 1 EACH | Refills: 0 | Status: SHIPPED | OUTPATIENT
Start: 2022-03-27 | End: 2022-05-30

## 2022-03-27 RX ORDER — AZITHROMYCIN 250 MG/1
TABLET, FILM COATED ORAL
Qty: 6 TABLET | Refills: 0 | Status: SHIPPED | OUTPATIENT
Start: 2022-03-27 | End: 2022-04-01

## 2022-03-27 NOTE — ED PROVIDER NOTES
EMERGENCY DEPARTMENT HISTORY AND PHYSICAL EXAM      Date: 3/27/2022  Patient Name: Rogelio Ngo    History of Presenting Illness     Chief Complaint   Patient presents with    Cough     \"I have pneumonia\" congestion in chest onset middle of night last night. its a rattleing in chest feeling. History Provided By: Patient    HPI: Rogelio Ngo, 32 y.o. male with history of bipolar disorder, laryngeal cancer, ADHD, trigeminal neuralgia and others presents ambulatory to the ED with cc of a day or so of mild but constant congestion in his chest that seemed worse last night. Denies any chest pain or shortness of breath. He tells me he has a history of pneumonia and this is how it started in the past.  There has been no fever. He tells me he is coughing up some green stuff. He denies any history of asthma. He does smoke cigarettes. He tells me he is thrice vaccinated against COVID-19 and had a COVID-19 infection last year and there is no chance he has Covid. There are no known sick contacts and there has been no recent travel. There are no other complaints, changes, or physical findings at this time. PCP: Wan Farrell MD    Current Outpatient Medications   Medication Sig Dispense Refill    azithromycin (Zithromax Z-Mega) 250 mg tablet Take 2 tablets (500mg) on day 1; then take 1 tablet (250 mg) on days 2, 3, 4 and 5 6 Tablet 0    albuterol (PROVENTIL HFA, VENTOLIN HFA, PROAIR HFA) 90 mcg/actuation inhaler Take 2 Puffs by inhalation every four (4) hours as needed for Cough. 1 Each 0    predniSONE (STERAPRED DS) 10 mg dose pack Per Dose Pack instructions 21 Tablet 0    dextromethorphan-guaiFENesin (Robitussin Cough-Chest [place] DM) 5-100 mg/5 mL liqd Take 10 mL by mouth four (4) times daily as needed for Cough or Congestion.  1 Each 0     Past History     Past Medical History:  Past Medical History:   Diagnosis Date    [facility]     [facility]     laryngeal-3 years ago, in remission    Kidney stone     Psychiatric disorder     ADHD    Trigeminal neuralgia        Past Surgical History:  No past surgical history on file. Family History:  No family history on file. Social History:  Social History     Tobacco Use    Smoking status: Current Every Day Smoker     Packs/day: 0.50     Years: 5.00     Pack years: 2.50    Smokeless tobacco: Current User   Substance Use Topics    Alcohol use: Yes     Comment: occ    Drug use: No     Comment: hx of marijuana/lsd/hallucinogenics       Allergies: Allergies   Allergen Reactions    Seroquel [Quetiapine] Rash     Not allergic     Review of Systems   Review of Systems   Constitutional: Negative for fever. Respiratory: Positive for cough. Negative for shortness of breath. Cardiovascular: Negative for chest pain. Gastrointestinal: Negative for abdominal pain. All other systems reviewed and are negative. Physical Exam   Physical Exam  Vitals and nursing note reviewed. Constitutional:       General: He is not in acute distress. Appearance: He is well-developed. He is not toxic-appearing. HENT:      Head: Normocephalic and atraumatic. No right periorbital erythema or left periorbital erythema. Right Ear: Tympanic membrane and external ear normal. There is no impacted cerumen. Tympanic membrane is not erythematous. Left Ear: Tympanic membrane and external ear normal. There is no impacted cerumen. Tympanic membrane is not erythematous. Nose: Nose normal.      Mouth/Throat:      Lips: No lesions. Pharynx: Uvula midline. No oropharyngeal exudate or posterior oropharyngeal erythema. Tonsils: No tonsillar exudate. Eyes:      General: No scleral icterus. Conjunctiva/sclera: Conjunctivae normal.      Pupils: Pupils are equal, round, and reactive to light. Cardiovascular:      Rate and Rhythm: Normal rate. Pulmonary:      Effort: Pulmonary effort is normal. No respiratory distress. Comments:   Breathing is unlabored and lungs are clear to auscultation throughout  Abdominal:      Palpations: Abdomen is soft. Tenderness: There is no abdominal tenderness. Musculoskeletal:         General: Normal range of motion. Cervical back: Normal range of motion. Skin:     Findings: No rash. Neurological:      Mental Status: He is alert and oriented to person, place, and time. He is not disoriented. Cranial Nerves: No cranial nerve deficit. Sensory: No sensory deficit. Psychiatric:         Speech: Speech normal.       Diagnostic Study Results     Labs -   No results found for this or any previous visit (from the past 12 hour(s)). Radiologic Studies -   XR CHEST PA LAT   Final Result   No acute cardiopulmonary abnormality. CT Results  (Last 48 hours)    None        CXR Results  (Last 48 hours)               03/27/22 1703  XR CHEST PA LAT Final result    Impression:  No acute cardiopulmonary abnormality. Narrative:  EXAM:  XR CHEST PA LAT       INDICATION:  Congestion       COMPARISON: May 2019. TECHNIQUE: PA and lateral chest radiographs. FINDINGS: The lungs are clear. Heart size and mediastinal contours are normal.   No pleural effusion or pneumothorax. Bones are age-appropriate. Medical Decision Making   I am the first provider for this patient. I reviewed the vital signs, available nursing notes, past medical history, past surgical history, family history and social history. Vital Signs-Reviewed the patient's vital signs. Patient Vitals for the past 12 hrs:   Temp Pulse Resp BP SpO2   03/27/22 1502 98.6 °F (37 °C) 98 16 124/76 99 %       Pulse Oximetry Analysis - 99% on RA    Records Reviewed: Nursing Notes, Old Medical Records, Previous Radiology Studies, Previous Laboratory Studies and     Provider Notes (Medical Decision Making):   DDx includes pneumonia, bronchitis, bronchospasm, URI.      Afebrile; no hypoxemia; no tachypnea; well appearing  Lungs are clear; breathing unlabored. CXR is negative. Given smoking history and report of productive cough, will cover with macrolide for atypical organisms. Bronchodilator; cough suppressant. URGED to quit smoking; tobacco cessation handout provided. Informational handout; return precautions. As the patient is being discharged he seemed confused that he was not being tested for Covid. I reminded him that one of the first things he told me is that he was fully vaccinated and had already had Covid and there was no chance he had Covid. He tells me he believes his work will require Covid testing. A discharge SARS-CoV-2 swab is ordered prior to the patient's departure. He is directed to the Rapid Pathogen Screening kristyn. TOBACCO COUNSELING:  Spent 1-5 minutes discussing the risks of smoking and the benefits of smoking cessation as well as the long term sequelae of smoking with the pt who verbalized his understanding. Reviewed strategies for success, including gradually decreasing the number of cigarettes smoked a day. ED Course:   Initial assessment performed. The patients presenting problems have been discussed, and they are in agreement with the care plan formulated and outlined with them. I have encouraged them to ask questions as they arise throughout their visit. Disposition:  Discharge    PLAN:  1.    Discharge Medication List as of 3/27/2022  5:23 PM      START taking these medications    Details   azithromycin (Zithromax Z-Mega) 250 mg tablet Take 2 tablets (500mg) on day 1; then take 1 tablet (250 mg) on days 2, 3, 4 and 5, Normal, Disp-6 Tablet, R-0      albuterol (PROVENTIL HFA, VENTOLIN HFA, PROAIR HFA) 90 mcg/actuation inhaler Take 2 Puffs by inhalation every four (4) hours as needed for Cough., Normal, Disp-1 Each, R-0      predniSONE (STERAPRED DS) 10 mg dose pack Per Dose Pack instructions, Normal, Disp-21 Tablet, R-0      dextromethorphan-guaiFENesin (Robitussin Cough-Chest [place] DM) 5-100 mg/5 mL liqd Take 10 mL by mouth four (4) times daily as needed for Cough or Congestion. , Normal, Disp-1 Each, R-0           2. Follow-up Information     Follow up With Specialties Details Why Contact Info    Cassandra Spencer MD Internal Medicine Call  PRIMARY CARE: as needed [address] [facility]  [phone]          Return to ED if worse     Diagnosis     Clinical Impression:   1. Acute bronchitis, unspecified organism    2. Smoking addiction    3.  Person under investigation for COVID-19

## 2022-03-27 NOTE — LETTER
[facility] EMERGENCY DEPT  [address]  [place] [address]  [phone]    Work/School Note    Date: 3/27/2022    To Whom It May concern:    Ziyad Renae was seen and treated today in the emergency room by the following provider(s):  Attending Provider: Gary Genao MD  Physician Assistant: HERMES Kemp. Ziyad Renae may return to work on [id number].     Sincerely,          HERMES Cruz

## 2022-03-27 NOTE — ED NOTES
I have reviewed discharge instructions with the patient. The patient verbalized understanding. Pt walked out with steady gait. Statement Selected

## 2022-03-28 ENCOUNTER — PATIENT OUTREACH (OUTPATIENT)
Dept: CASE MANAGEMENT | Age: 32
End: 2022-03-28

## 2022-03-28 LAB
SARS-COV-2, XPLCVT: NOT DETECTED
SOURCE, COVRS: NORMAL

## 2022-03-28 NOTE — PROGRESS NOTES
ED follow up chest discomfort unable to LM  Second attempt made unable to reach patient   Episode resolved

## 2022-05-30 ENCOUNTER — HOSPITAL ENCOUNTER (EMERGENCY)
Age: 32
Discharge: HOME | End: 2022-05-30
Attending: EMERGENCY MEDICINE
Payer: COMMERCIAL

## 2022-05-30 VITALS
HEART RATE: 66 BPM | OXYGEN SATURATION: 100 % | RESPIRATION RATE: 18 BRPM | BODY MASS INDEX: 23.15 KG/M2 | HEIGHT: 71 IN | WEIGHT: 165.34 LBS | DIASTOLIC BLOOD PRESSURE: 94 MMHG | SYSTOLIC BLOOD PRESSURE: 148 MMHG

## 2022-05-30 DIAGNOSIS — G50.0 TRIGEMINAL NEURALGIA: Primary | ICD-10-CM

## 2022-05-30 PROCEDURE — 74011000250 HC RX REV CODE- 250: Performed by: PHYSICIAN ASSISTANT

## 2022-05-30 PROCEDURE — 99283 EMERGENCY DEPT VISIT LOW MDM: CPT

## 2022-05-30 PROCEDURE — 74011250637 HC RX REV CODE- 250/637: Performed by: PHYSICIAN ASSISTANT

## 2022-05-30 RX ORDER — GABAPENTIN 100 MG/1
100 CAPSULE ORAL 3 TIMES DAILY
Qty: 21 CAPSULE | Refills: 0 | Status: SHIPPED | OUTPATIENT
Start: 2022-05-30 | End: 2022-06-06

## 2022-05-30 RX ORDER — GABAPENTIN 100 MG/1
100 CAPSULE ORAL
Status: COMPLETED | OUTPATIENT
Start: 2022-05-30 | End: 2022-05-30

## 2022-05-30 RX ORDER — BUPRENORPHINE AND NALOXONE 8; 2 MG/1; MG/1
FILM, SOLUBLE BUCCAL; SUBLINGUAL DAILY
COMMUNITY

## 2022-05-30 RX ADMIN — BENZOCAINE, BUTAMBEN, AND TETRACAINE HYDROCHLORIDE: .028; .004; .004 AEROSOL, SPRAY TOPICAL at 21:28

## 2022-05-30 RX ADMIN — GABAPENTIN 100 MG: 100 CAPSULE ORAL at 21:28

## 2022-05-31 NOTE — ED TRIAGE NOTES
Patient presents to triage ambulatory complaining of dental pain. Patient requested that his girlfriend speak for him because the pain is unbearable. Patient reports that he can not afford any dental work at this time.

## 2022-05-31 NOTE — ED PROVIDER NOTES
EMERGENCY DEPARTMENT HISTORY AND PHYSICAL EXAM      Date: 5/30/2022  Patient Name: Samson Hopson    History of Presenting Illness     Chief Complaint   Patient presents with    Dental Problem       History Provided By: Patient    HPI: Samson Hopson, 32 y.o. male with significant PMHx for trigeminal neuralgia, presents by POV to the ED with cc of right-sided facial pain consistent with prior bouts of trigeminal neuralgia. His pain started several weeks ago and is progressively worsened in onset. He denies trauma. There is been no dental pain. He denies fever, chills, rhinorrhea, congestion. The pain does radiate to his ear. He has been using Orajel and taking Tylenol without relief. 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 The patient reports that he was recently incarcerated for about 3 years. Prior to his incarceration he was taking gabapentin for his trigeminal neuralgia but has not been on this in quite some time. There are no other complaints, changes, or physical findings at this time. Social Hx: Tobacco (1.5 ppd), EtOH (denies), Illicit drug use (former heroin abuser; on suboxone now)     PCP: Jason Almonte MD    No current facility-administered medications on file prior to encounter. Current Outpatient Medications on File Prior to Encounter   Medication Sig Dispense Refill    buprenorphine-naloxone (Suboxone) 8-2 mg film sublingaul film by SubLINGual route daily.  [DISCONTINUED] albuterol (PROVENTIL HFA, VENTOLIN HFA, PROAIR HFA) 90 mcg/actuation inhaler Take 2 Puffs by inhalation every four (4) hours as needed for Cough. 1 Each 0    [DISCONTINUED] predniSONE (STERAPRED DS) 10 mg dose pack Per Dose Pack instructions 21 Tablet 0    [DISCONTINUED] dextromethorphan-guaiFENesin (Robitussin Cough-Chest [place] DM) 5-100 mg/5 mL liqd Take 10 mL by mouth four (4) times daily as needed for Cough or Congestion.  1 Each 0       Past History     Past Medical History:  Past Medical History:   Diagnosis Date    B[facility] disorder ([facility]     Cancer ([facility]     laryngeal-3 years ago, in remission    Kidney stone     Psychiatric disorder     ADHD    Trigeminal neuralgia        Past Surgical History:  No past surgical history on file. Family History:  No family history on file. Social History:  Social History     Tobacco Use    Smoking status: Current Every Day Smoker     Packs/day: 0.50     Years: 5.00     Pack years: 2.50    Smokeless tobacco: Current User   Substance Use Topics    Alcohol use: Yes     Comment: occ    Drug use: No     Comment: hx of marijuana/lsd/hallucinogenics       Allergies: Allergies   Allergen Reactions    Seroquel [Quetiapine] Rash     Not allergic         Review of Systems   Review of Systems   Constitutional: Negative for chills, diaphoresis and fever. HENT: Negative for congestion, dental problem, ear pain, rhinorrhea and sore throat. Respiratory: Negative for cough and shortness of breath. Cardiovascular: Negative for chest pain. Gastrointestinal: Negative for abdominal pain, constipation, diarrhea, nausea and vomiting. Genitourinary: Negative for difficulty urinating, dysuria, frequency and hematuria. Musculoskeletal: Negative for arthralgias and myalgias. Neurological: Negative for headaches. All other systems reviewed and are negative. Physical Exam   Physical Exam  Vitals and nursing note reviewed. Constitutional:       General: He is not in acute distress. Appearance: He is well-developed. He is not diaphoretic. Comments: 32 y.o.  male    HENT:      Head: Normocephalic and atraumatic. Right Ear: Tympanic membrane and ear canal normal.      Left Ear: Tympanic membrane and ear canal normal.      Nose: Nose normal. No congestion or rhinorrhea. Mouth/Throat:      Mouth: Mucous membranes are moist.      Pharynx: Oropharynx is clear.  No oropharyngeal exudate or posterior oropharyngeal erythema. Eyes:      General:         Right eye: No discharge. Left eye: No discharge. Conjunctiva/sclera: Conjunctivae normal.   Cardiovascular:      Rate and Rhythm: Normal rate and regular rhythm. Heart sounds: Normal heart sounds. No murmur heard. Pulmonary:      Effort: Pulmonary effort is normal. No respiratory distress. Breath sounds: Normal breath sounds. Musculoskeletal:      Cervical back: Normal range of motion and neck supple. Skin:     General: Skin is warm and dry. Neurological:      Mental Status: He is alert and oriented to person, place, and time. Psychiatric:         Behavior: Behavior normal.         Diagnostic Study Results     Labs - none    Radiologic Studies - none    Medical Decision Making   I am the first provider for this patient. I reviewed the vital signs, available nursing notes, past medical history, past surgical history, family history and social history. Vital Signs-Reviewed the patient's vital signs. Patient Vitals for the past 12 hrs:   Pulse Resp BP SpO2   05/30/22 1959 66 18 (!) 148/94 100 %       Records Reviewed: Nursing Notes and Old Medical Records    reviewed. Provider Notes (Medical Decision Making):   Presents ED with stable vital signs for right-sided facial pain. Differential diagnosis include but not limited to otitis media, otitis externa, dentalgia, dental abscess, trigeminal neuralgia, facial pain. Will treat with gabapentin for several days until he is able to follow-up with somebody who can manage his long-term pain. He can always return to the ED for deterioration. ED Course:   Initial assessment performed. The patients presenting problems have been discussed, and they are in agreement with the care plan formulated and outlined with them. I have encouraged them to ask questions as they arise throughout their visit.     Tobacco Counseling  Discussed the risks of smoking and the benefits of smoking cessation as well as the long term sequelae of smoking with the patient. The patient verbalized their understanding. Counseled patient for approximately 3 minutes. Critical Care Time: None    Disposition:  DISCHARGE NOTE:  9:16 PM  The pt is ready for discharge. The pt's signs, symptoms, diagnosis, and discharge instructions have been discussed and pt has conveyed their understanding. The pt is to follow up as recommended or return to ER should their symptoms worsen. Plan has been discussed and pt is in agreement. PLAN:  1. Current Discharge Medication List      START taking these medications    Details   gabapentin (NEURONTIN) 100 mg capsule Take 1 Capsule by mouth three (3) times daily for 7 days. Max Daily Amount: 300 mg. Qty: 21 Capsule, Refills: 0  Start date: 5/30/2022, End date: 6/6/2022    Associated Diagnoses: Trigeminal neuralgia           2. Follow-up Information     Follow up With Specialties Details Why Contact Info    Jose Fountain MD Internal Medicine Physician In 1 week As needed, If not improved [address] [facility]  [phone]      Mihai Hightower MD Otolaryngology In 1 week As needed, If not improved [address]  [address]  [facility] [phone]      [facility] EM[facility] Emergency Medicine  If symptoms worsen [address]  [address] [facility]  [phone]        Return to ED if worse     Diagnosis     Clinical Impression:   1. Trigeminal neuralgia          Please note that this dictation was completed with Sim[employer], the computer voice recognition software. Quite often unanticipated grammatical, syntax, homophones, and other interpretive errors are inadvertently transcribed by the computer software. Please disregards these errors. Please excuse any errors that have escaped final proofreading.

## 2022-05-31 NOTE — DISCHARGE INSTRUCTIONS
It was a pleasure taking care of you at [facility] Emergency Department today. We know that when you come to [facility], you are entrusting us with your health, comfort, and safety. Our physicians and nurses honor that trust, and we truly appreciate the opportunity to care for you and your loved ones. We also value your feedback. If you receive a survey about your Emergency Department experience today, please fill it out. We care about our patients' feedback, and we listen to what you have to say. Thank you!

## 2022-06-22 ENCOUNTER — HOSPITAL ENCOUNTER (EMERGENCY)
Age: 32
Discharge: HOME | End: 2022-06-22
Attending: EMERGENCY MEDICINE
Payer: COMMERCIAL

## 2022-06-22 VITALS
WEIGHT: 156 LBS | OXYGEN SATURATION: 100 % | BODY MASS INDEX: 30.63 KG/M2 | HEIGHT: 60 IN | SYSTOLIC BLOOD PRESSURE: 147 MMHG | RESPIRATION RATE: 18 BRPM | DIASTOLIC BLOOD PRESSURE: 90 MMHG | TEMPERATURE: 98 F | HEART RATE: 83 BPM

## 2022-06-22 DIAGNOSIS — G50.0 TRIGEMINAL NEURALGIA SYNDROME: Primary | ICD-10-CM

## 2022-06-22 DIAGNOSIS — K08.89 PAIN, DENTAL: ICD-10-CM

## 2022-06-22 LAB
AMPHET UR QL SCN: NEGATIVE
APPEARANCE UR: CLEAR
BACTERIA URNS QL MICRO: NEGATIVE /HPF
BARBITURATES UR QL SCN: NEGATIVE
BENZODIAZ UR QL: NEGATIVE
BILIRUB UR QL: NEGATIVE
CANNABINOIDS UR QL SCN: NEGATIVE
COCAINE UR QL SCN: NEGATIVE
COLOR UR: ABNORMAL
DRUG SCRN COMMENT,DRGCM: NORMAL
EPITH CASTS URNS QL MICRO: ABNORMAL /LPF
GLUCOSE UR STRIP.AUTO-MCNC: NEGATIVE MG/DL
HGB UR QL STRIP: NEGATIVE
HYALINE CASTS URNS QL MICRO: ABNORMAL /LPF (ref 0–2)
KETONES UR QL STRIP.AUTO: ABNORMAL MG/DL
LEUKOCYTE ESTERASE UR QL STRIP.AUTO: NEGATIVE
METHADONE UR QL: NEGATIVE
NITRITE UR QL STRIP.AUTO: NEGATIVE
OPIATES UR QL: NEGATIVE
PCP UR QL: NEGATIVE
PH UR STRIP: 6 [PH] (ref 5–8)
PROT UR STRIP-MCNC: NEGATIVE MG/DL
RBC #/AREA URNS HPF: ABNORMAL /HPF (ref 0–5)
SP GR UR REFRACTOMETRY: 1.02
UA: UC IF INDICATED,UAUC: ABNORMAL
UROBILINOGEN UR QL STRIP.AUTO: 1 EU/DL (ref 0.2–1)
WBC URNS QL MICRO: ABNORMAL /HPF (ref 0–4)

## 2022-06-22 PROCEDURE — 74011000250 HC RX REV CODE- 250: Performed by: EMERGENCY MEDICINE

## 2022-06-22 PROCEDURE — 99283 EMERGENCY DEPT VISIT LOW MDM: CPT

## 2022-06-22 PROCEDURE — 87491 CHLMYD TRACH DNA AMP PROBE: CPT

## 2022-06-22 PROCEDURE — 81001 URINALYSIS AUTO W/SCOPE: CPT

## 2022-06-22 PROCEDURE — 80307 DRUG TEST PRSMV CHEM ANLYZR: CPT

## 2022-06-22 PROCEDURE — 74011250637 HC RX REV CODE- 250/637: Performed by: EMERGENCY MEDICINE

## 2022-06-22 RX ORDER — GABAPENTIN 100 MG/1
100 CAPSULE ORAL 3 TIMES DAILY
Qty: 30 CAPSULE | Refills: 0 | Status: SHIPPED | OUTPATIENT
Start: 2022-06-22 | End: 2022-08-11

## 2022-06-22 RX ORDER — GABAPENTIN 100 MG/1
100 CAPSULE ORAL
Status: COMPLETED | OUTPATIENT
Start: 2022-06-22 | End: 2022-06-22

## 2022-06-22 RX ORDER — PENICILLIN V POTASSIUM 500 MG/1
500 TABLET, FILM COATED ORAL 4 TIMES DAILY
Qty: 40 TABLET | Refills: 0 | Status: SHIPPED | OUTPATIENT
Start: 2022-06-22

## 2022-06-22 RX ADMIN — GABAPENTIN 100 MG: 100 CAPSULE ORAL at 05:39

## 2022-06-22 RX ADMIN — BENZOCAINE, BUTAMBEN, AND TETRACAINE HYDROCHLORIDE: .028; .004; .004 AEROSOL, SPRAY TOPICAL at 04:29

## 2022-06-22 NOTE — ED NOTES
Pt presents w/ need for refill of gabapentin. Pt also reports catching a UTI from his girlfriend. Pt also reports left sided migraine that fell swollen to pt.

## 2022-06-22 NOTE — Clinical Note
[address]  hospitals EMERGENCY DEPT  [address]  [place] [address] [phone]    Work/School Note    Date: 6/22/2022    To Whom It May concern:    Mario Mcadams was seen and treated today in the emergency room by the following provider(s):  Attending Provider: Jeffry Kamara MD.      Mario Mcadams is excused from work/school on 06/22/22 and 06/23/22. He is medically clear to return to work/school on 6/24/2022.        Sincerely,          Irvin Adams MD

## 2022-06-22 NOTE — Clinical Note
[address]  [facility] EMERGENCY DEPT  [address] [place] [address]  [place] [address]  [phone]    Work/School Note    Date: 6/22/2022    To Whom It May concern:    Aarti Eyl was seen and treated today in the emergency room by the following provider(s):  Attending Provider: Edwardo Shaffer MD.      Aarti Ely is excused from work/school on 06/22/22 and 06/23/22. He is medically clear to return to work/school on 6/24/2022.        Sincerely,          Gio Campa MD

## 2022-06-22 NOTE — ED PROVIDER NOTES
EMERGENCY DEPARTMENT HISTORY AND PHYSICAL EXAM      Date: 6/22/2022  Patient Name: Greg Hsieh    History of Presenting Illness     Chief Complaint   Patient presents with    Other     Patient arrives with complaint of temple swelling earlier. Patient diagnosed with trigeminal neuralgia and was referred to neurology. Patient states that he is out of gabapentin and cannot see neurology for several weeks. Patient also reports having a possible seizure earlier today at work. History Provided By: Patient    HPI: Greg Hsieh, 28 y.o. male with PMHx significant for substance abuse, opiate addiction on Suboxone, bipolar disorder, ADHD, trigeminal neuralgia presents to the ED with chief complaint of left facial pain. Patient has had ongoing problems with trigeminal neuralgia. He reports that he used to take 600 mg gabapentin twice a day he did not have any more of his gabapentin in the ED a few weeks ago and prescribed 100 mg tablets 3 times a day. He has used those up and having pain again. Reports that trigeminal neuralgia is bilateral, but today it is worse on the left side. He is also had problems with his teeth and using them he became to help with his pain. Thinks that until pain may be exacerbating his trigeminal neuralgia. Denies any fevers or chills. Denies numbness, weakness. Denies any neck pain, headache. Denies chest pain, shortness of breath, vomiting, diarrhea states he has an appointment with neurologist.  Patient reports that his trigeminal neuralgia was so bad tonight when he was at work that he thinks he may have had a seizure. He had to leave work early. He rode his skateboard here from home. Patient would also like to have his urine checked because his significant other has a urinary. PCP: Emilia Pacheco MD    No current facility-administered medications on file prior to encounter.      Current Outpatient Medications on File Prior to Encounter   Medication Sig Dispense Refill    buprenorphine-naloxone (Suboxone) 8-2 mg film sublingaul film by SubLINGual route daily. Past History     Past Medical History:  Past Medical History:   Diagnosis Date    [facility] [facility]     Cancer ([facility] [address]     laryngeal-3 years ago, in remission    Kidney stone     Psychiatric disorder     ADHD    Trigeminal neuralgia        Past Surgical History:  No past surgical history on file. Family History:  No family history on file. Social History:  Social History     Tobacco Use    Smoking status: Current Every Day Smoker     Packs/day: 0.50     Years: 5.00     Pack years: 2.50    Smokeless tobacco: Current User   Substance Use Topics    Alcohol use: Yes     Comment: occ    Drug use: No     Comment: hx of marijuana/lsd/hallucinogenics       Allergies: Allergies   Allergen Reactions    Seroquel [Quetiapine] Rash     Not allergic         Review of Systems   Review of Systems   Constitutional: Negative for chills and fever. HENT: Positive for dental problem. Negative for congestion, ear pain, facial swelling, sinus pain, sore throat and trouble swallowing. Eyes: Negative for visual disturbance. Respiratory: Negative for cough, chest tightness, shortness of breath and wheezing. Cardiovascular: Negative for chest pain and palpitations. Gastrointestinal: Negative for diarrhea and nausea. Genitourinary: Positive for dysuria. Negative for flank pain and hematuria. Musculoskeletal: Negative for back pain, myalgias and neck pain. Skin: Negative for rash and wound. Neurological: Negative for dizziness, syncope, light-headedness and headaches. Psychiatric/Behavioral: The patient is nervous/anxious. All other systems reviewed and are negative.         Physical Exam   General appearance - well nourished, well appearing, and in no distress, anxious pressured speech  Eyes - pupils equal and reactive, extraocular eye movements intact  ENT - mucous membranes moist, pharynx normal without lesions widespread dental caries, tender to palpation over bilateral facial nerves  Neck - supple, no significant adenopathy; non-tender to palpation  Chest - clear to auscultation, no wheezes, rales or rhonchi; non-tender to palpation  Heart - normal rate and regular rhythm, S1 and S2 normal, no murmurs noted  Abdomen - soft, nontender, nondistended, no masses or organomegaly  Musculoskeletal - no joint tenderness, deformity or swelling; normal ROM  Extremities - peripheral pulses normal, no pedal edema  Skin - normal coloration and turgor, no rashes  Neurological - alert, oriented x3, normal speech, no focal findings or movement disorder noted    Diagnostic Study Results     Labs -     Recent Results (from the past 12 hour(s))   URINALYSIS W/ REFLEX CULTURE    Collection Time: 06/22/22  4:21 AM    Specimen: Urine   Result Value Ref Range    Color YELLOW/STRAW      Appearance CLEAR CLEAR      Specific gravity 1.025      pH (UA) 6.0 5.0 - 8.0      Protein Negative NEG mg/dL    Glucose Negative NEG mg/dL    Ketone TRACE (A) NEG mg/dL    Bilirubin Negative NEG      Blood Negative NEG      Urobilinogen 1.0 0.2 - 1.0 EU/dL    Nitrites Negative NEG      Leukocyte Esterase Negative NEG      UA:UC IF INDICATED CULTURE NOT INDICATED BY UA RESULT      WBC 0-4 0 - 4 /hpf    RBC 0-5 0 - 5 /hpf    Epithelial cells FEW FEW /lpf    Bacteria Negative NEG /hpf    Hyaline cast 0-2 0 - 2 /lpf   DRUG SCREEN, URINE    Collection Time: 06/22/22  4:21 AM   Result Value Ref Range    AMPHETAMINES Negative NEG      BARBITURATES Negative NEG      BENZODIAZEPINES Negative NEG      COCAINE Negative NEG      METHADONE Negative NEG      OPIATES Negative NEG      PCP(PHENCYCLIDINE) Negative NEG      THC (TH-CANNABINOL) Negative NEG      Drug screen comment (NOTE)        Radiologic Studies -   No orders to display     CT Results  (Last 48 hours)    None        CXR Results  (Last 48 hours)    None            Medical Decision Making   I am the first provider for this patient. I reviewed the vital signs, available nursing notes, past medical history, past surgical history, family history and social history. Vital Signs-Reviewed the patient's vital signs. Patient Vitals for the past 12 hrs:   Temp Pulse Resp BP SpO2   06/22/22 0026 98 °F (36.7 °C) 83 18 (!) 147/90 100 %       Records Reviewed: Nursing Notes and Old Medical Records    Provider Notes (Medical Decision Making):   Differential diagnosis: UTI, trigeminal neuralgia, dental pain, dental caries    ED Course:   Initial assessment performed. The patients presenting problems have been discussed, and they are in agreement with the care plan formulated and outlined with them. I have encouraged them to ask questions as they arise throughout their visit. Progress Notes:   Patient is declining treatment for GC chlamydia. Will send urine for testing. Will treat with gabapentin and dental balls and VK for possible dental infection. Follow-up with dentist.  Return to ED for worsening symptoms    Disposition:  Discharge home    PLAN:  1. Current Discharge Medication List      START taking these medications    Details   gabapentin (NEURONTIN) 100 mg capsule Take 1 Capsule by mouth three (3) times daily. Max Daily Amount: 300 mg. Qty: 30 Capsule, Refills: 0  Start date: 6/22/2022    Associated Diagnoses: Trigeminal neuralgia syndrome      penicillin v potassium (VEETID) 500 mg tablet Take 1 Tablet by mouth four (4) times daily. Qty: 40 Tablet, Refills: 0  Start date: 6/22/2022           2.    Follow-up Information     Follow up With Specialties Details Why Contact Info    Jose Fountain MD Internal Medicine Physician Schedule an appointment as soon as possible for a visit   [facility] [address] EMERGENCY DEPT Emergency Medicine  If symptoms worsen [address]  [phone]        Return to ED if worse     Diagnosis     Clinical Impression:   1. Trigeminal neuralgia syndrome    2.  Pain, dental

## 2022-06-24 LAB
C TRACH RRNA SPEC QL NAA+PROBE: NEGATIVE
N GONORRHOEA RRNA SPEC QL NAA+PROBE: NEGATIVE
SPECIMEN SOURCE: NORMAL

## 2022-08-11 ENCOUNTER — HOSPITAL ENCOUNTER (EMERGENCY)
Age: 32
Discharge: HOME | End: 2022-08-11
Attending: EMERGENCY MEDICINE
Payer: COMMERCIAL

## 2022-08-11 VITALS
DIASTOLIC BLOOD PRESSURE: 75 MMHG | HEIGHT: 71 IN | TEMPERATURE: 98 F | OXYGEN SATURATION: 99 % | RESPIRATION RATE: 18 BRPM | WEIGHT: 165.34 LBS | HEART RATE: 98 BPM | SYSTOLIC BLOOD PRESSURE: 107 MMHG | BODY MASS INDEX: 23.15 KG/M2

## 2022-08-11 DIAGNOSIS — G50.0 TRIGEMINAL NEURALGIA SYNDROME: ICD-10-CM

## 2022-08-11 DIAGNOSIS — K04.7 DENTAL ABSCESS: Primary | ICD-10-CM

## 2022-08-11 PROCEDURE — 74011250637 HC RX REV CODE- 250/637: Performed by: PHYSICIAN ASSISTANT

## 2022-08-11 PROCEDURE — 93005 ELECTROCARDIOGRAM TRACING: CPT

## 2022-08-11 PROCEDURE — 74011000250 HC RX REV CODE- 250: Performed by: PHYSICIAN ASSISTANT

## 2022-08-11 PROCEDURE — 96365 THER/PROPH/DIAG IV INF INIT: CPT

## 2022-08-11 PROCEDURE — 74011250636 HC RX REV CODE- 250/636: Performed by: PHYSICIAN ASSISTANT

## 2022-08-11 PROCEDURE — 99284 EMERGENCY DEPT VISIT MOD MDM: CPT

## 2022-08-11 RX ORDER — GABAPENTIN 100 MG/1
100 CAPSULE ORAL ONCE
Status: COMPLETED | OUTPATIENT
Start: 2022-08-11 | End: 2022-08-11

## 2022-08-11 RX ORDER — GABAPENTIN 100 MG/1
100 CAPSULE ORAL 3 TIMES DAILY
Qty: 20 CAPSULE | Refills: 0 | Status: SHIPPED | OUTPATIENT
Start: 2022-08-11

## 2022-08-11 RX ORDER — CLINDAMYCIN HYDROCHLORIDE 300 MG/1
300 CAPSULE ORAL 4 TIMES DAILY
Qty: 40 CAPSULE | Refills: 0 | Status: SHIPPED | OUTPATIENT
Start: 2022-08-11

## 2022-08-11 RX ORDER — CLINDAMYCIN PHOSPHATE 600 MG/50ML
600 INJECTION INTRAVENOUS
Status: COMPLETED | OUTPATIENT
Start: 2022-08-11 | End: 2022-08-11

## 2022-08-11 RX ADMIN — GABAPENTIN 100 MG: 100 CAPSULE ORAL at 13:44

## 2022-08-11 RX ADMIN — DIPHENHYDRAMINE HYDROCHLORIDE: 12.5 LIQUID ORAL at 14:09

## 2022-08-11 RX ADMIN — CLINDAMYCIN IN 5 PERCENT DEXTROSE 600 MG: 12 INJECTION, SOLUTION INTRAVENOUS at 13:44

## 2022-08-11 NOTE — ED PROVIDER NOTES
EMERGENCY DEPARTMENT HISTORY AND PHYSICAL EXAM      Date: 8/11/2022  Patient Name: Kaleta Osgood    History of Presenting Illness     Chief Complaint   Patient presents with    Dental Pain     Pt ambulatory into triage d/t dental pain x 2 days w/ L sided facial pain. Pt notes he was eating a peach and \"cracked\" his back L tooth. Pt has taken ibuprofen and BC powder w/out relief. Pt additionally notes that he has a seizure this am that lasted about 4 min, pt notes he has a recent hx of seizures but does not take medication. Pt takes gabapentin for trigeminal neuralgia. Pt reporting he has not slept well in about 2 days, pressured speech in triage but oriented x 4       Seizure       History Provided By: Patient    HPI: Kaleta Osgood, 28 y.o. male presents to the ED with cc of left facial swelling since this morning. Patient notes he has poor dentition and a history of trigeminal neuralgia. He has been extinct experiencing pain for the past month. 3 days ago he was eating when he broke a tooth, which has caused worsening pain. Patient's current pain is an 8 out of 10. The patient has a dentist appointment on September 7. The patient has tried [facility] powder, Tylenol, Vicks Vapocool, and ice with no relief. He started taking a dose of penicillin VK last night and has taken 3 doses total.  Patient is unsure of fever and believes he may have had an episode of pus drainage from the site. The patient is also requesting a refill of his gabapentin for his trigeminal neuralgia, noting the pain is worse with the swelling and dental pain. Patient denies color change, chest pain, shortness of breath, abdominal pain, nausea, vomiting, and diarrhea. PMH: Hepatitis C, Trigeminal neuralgia, seizure, and throat cancer  Allergies: Seroquel  SH: + tobacco, pt denies alcohol and illicit drug use    There are no other complaints, changes, or physical findings at this time.     PCP: Gianna Ornelas MD    No current facility-administered medications on file prior to encounter. Current Outpatient Medications on File Prior to Encounter   Medication Sig Dispense Refill    penicillin v potassium (VEETID) 500 mg tablet Take 1 Tablet by mouth four (4) times daily. 40 Tablet 0    [DISCONTINUED] gabapentin (NEURONTIN) 100 mg capsule Take 1 Capsule by mouth three (3) times daily. Max Daily Amount: 300 mg. 30 Capsule 0    buprenorphine-naloxone (Suboxone) 8-2 mg film sublingaul film by SubLINGual route daily. Past History     Past Medical History:  Past Medical History:   Diagnosis Date    Bipolar 1 disorder ([facility] [address]     Cancer ([facility] [address]     laryngeal-3 years ago, in remission    Kidney stone     Psychiatric disorder     ADHD    Trigeminal neuralgia        Past Surgical History:  No past surgical history on file. Family History:  No family history on file. Social History:  Social History     Tobacco Use    Smoking status: Every Day     Packs/day: 0.50     Years: 5.00     Pack years: 2.50     Types: Cigarettes    Smokeless tobacco: Current   Substance Use Topics    Alcohol use: Yes     Comment: occ    Drug use: No     Comment: hx of marijuana/lsd/hallucinogenics       Allergies: Allergies   Allergen Reactions    Seroquel [Quetiapine] Rash     Not allergic         Review of Systems   Review of Systems    Physical Exam   Physical Exam  Vitals reviewed. Constitutional:       General: He is not in acute distress. Appearance: He is well-developed. He is not diaphoretic. Comments: Pt appears well, awake and alert in NAD. HENT:      Head: Normocephalic and atraumatic. Comments: Mild left facial edema, does not cross mandible. No erythema or fluctuance. Right Ear: Tympanic membrane, ear canal and external ear normal.      Left Ear: Tympanic membrane, ear canal and external ear normal.      Nose: Nose normal.      Mouth/Throat:      Pharynx: Oropharynx is clear. Uvula midline.  No pharyngeal swelling, oropharyngeal exudate or posterior oropharyngeal erythema. Comments:  Dental fracture with dental decay (see diagram in red). Multiple other teeth with dental fracture and decay. No surrounding erythema or edema. No fluctuance or discharge. Eyes:      General:         Right eye: No discharge. Left eye: No discharge. Conjunctiva/sclera: Conjunctivae normal.      Comments: Pinpoint pupils   Cardiovascular:      Rate and Rhythm: Normal rate. Heart sounds: Normal heart sounds. Pulmonary:      Effort: Pulmonary effort is normal. No respiratory distress. Breath sounds: Normal breath sounds. No wheezing or rales. Chest:      Chest wall: No tenderness. Abdominal:      General: Bowel sounds are normal.      Palpations: Abdomen is soft. Tenderness: There is no abdominal tenderness. There is no guarding. Musculoskeletal:         General: No tenderness. Normal range of motion. Cervical back: Normal range of motion. Skin:     General: Skin is warm and dry. Coloration: Skin is not pale. Findings: No erythema or rash. Neurological:      General: No focal deficit present. Mental Status: He is alert and oriented to person, place, and time. GCS: GCS eye subscore is 4. GCS verbal subscore is 5. GCS motor subscore is 6. Coordination: Coordination normal.      Comments: No focal neuro deficits. Psychiatric:         Speech: Speech is rapid and pressured.        Diagnostic Study Results     Labs -     Recent Results (from the past 12 hour(s))   EKG, 12 LEAD, INITIAL    Collection Time: 08/11/22 11:07 AM   Result Value Ref Range    Ventricular Rate 97 BPM    Atrial Rate 97 BPM    P-R Interval 184 ms    QRS Duration 86 ms    Q-T Interval 334 ms    QTC Calculation (Bezet) 424 ms    Calculated P Axis 73 degrees    Calculated R Axis 72 degrees    Calculated T Axis 43 degrees    Diagnosis       Normal sinus rhythm  Normal ECG  When compared with ECG of 19-MAY-2019 21:51,  No significant change was found         Radiologic Studies -   No orders to display     CT Results  (Last 48 hours)      None          CXR Results  (Last 48 hours)      None            Medical Decision Making   I am the first provider for this patient. I reviewed the vital signs, available nursing notes, past medical history, past surgical history, family history and social history. Vital Signs-Reviewed the patient's vital signs. Patient Vitals for the past 12 hrs:   Temp Pulse Resp BP SpO2   08/11/22 1227 -- 98 -- -- --   08/11/22 1059 98 °F (36.7 °C) (!) 136 18 107/75 99 %         Records Reviewed: Old Medical Records    Provider Notes (Medical Decision Making): The patient is a 79-year-old male with a history of trigeminal neuralgia presents to the ED with left facial swelling and dental pain. Probable dental abscess we will give a dose of IV antibiotics and discharged with PO antibiotics. Patient reports a history of trigeminal neuralgia, will prescribe gabapentin and advised follow-up for continued maintenance. ED Course:   Initial assessment performed. The patients presenting problems have been discussed, and they are in agreement with the care plan formulated and outlined with them. I have encouraged them to ask questions as they arise throughout their visit. Disposition:  2:10PM  DC-The patient was given verbal follow-up instructions      DISCHARGE PLAN:  1. Current Discharge Medication List        START taking these medications    Details   clindamycin (CLEOCIN) 300 mg capsule Take 1 Capsule by mouth four (4) times daily. Qty: 40 Capsule, Refills: 0  Start date: 8/11/2022           CONTINUE these medications which have CHANGED    Details   gabapentin (NEURONTIN) 100 mg capsule Take 1 Capsule by mouth three (3) times daily. Max Daily Amount: 300 mg. Qty: 20 Capsule, Refills: 0  Start date: 8/11/2022    Associated Diagnoses: Trigeminal neuralgia syndrome           2. Follow-up Information       Follow up With Specialties Details Why [address] [facility] Schedule an appointment as soon as possible for a visit  asap [address] [facility] [address]  [phone]    [facility] EMERGENCY DEPT Emergency Medicine  As needed or, If symptoms worsen [address]  [address] [facility]  [phone]          3. Return to ED if worse     Diagnosis     Clinical Impression:   1. Dental abscess    2. Trigeminal neuralgia syndrome        Attestations:    HERMES Gilbert        Please note that this dictation was completed with [employer], the computer voice recognition software. Quite often unanticipated grammatical, syntax, homophones, and other interpretive errors are inadvertently transcribed by the computer software. Please disregard these errors. Please excuse any errors that have escaped final proofreading. Thank you.

## 2022-08-11 NOTE — DISCHARGE INSTRUCTIONS
Start Clindamycin  Start Gabapentin  Follow up with dentistry asap, return to ED for any new or worsening symptoms

## 2022-08-12 LAB
ATRIAL RATE: 97 BPM
CALCULATED P AXIS, ECG09: 73 DEGREES
CALCULATED R AXIS, ECG10: 72 DEGREES
CALCULATED T AXIS, ECG11: 43 DEGREES
DIAGNOSIS, 93000: NORMAL
P-R INTERVAL, ECG05: 184 MS
Q-T INTERVAL, ECG07: 334 MS
QRS DURATION, ECG06: 86 MS
QTC CALCULATION (BEZET), ECG08: 424 MS
VENTRICULAR RATE, ECG03: 97 BPM

## 2023-03-25 ENCOUNTER — HOSPITAL ENCOUNTER (EMERGENCY)
Age: 33
Discharge: HOME | End: 2023-03-25
Attending: EMERGENCY MEDICINE
Payer: COMMERCIAL

## 2023-03-25 VITALS
TEMPERATURE: 97.7 F | OXYGEN SATURATION: 99 % | SYSTOLIC BLOOD PRESSURE: 123 MMHG | WEIGHT: 189.6 LBS | HEART RATE: 70 BPM | HEIGHT: 71 IN | BODY MASS INDEX: 26.54 KG/M2 | DIASTOLIC BLOOD PRESSURE: 64 MMHG | RESPIRATION RATE: 16 BRPM

## 2023-03-25 DIAGNOSIS — H10.33 ACUTE CONJUNCTIVITIS OF BOTH EYES, UNSPECIFIED ACUTE CONJUNCTIVITIS TYPE: Primary | ICD-10-CM

## 2023-03-25 PROCEDURE — 99283 EMERGENCY DEPT VISIT LOW MDM: CPT

## 2023-03-25 RX ORDER — MOXIFLOXACIN 5 MG/ML
SOLUTION/ DROPS OPHTHALMIC
Qty: 3 ML | Refills: 0 | Status: SHIPPED | OUTPATIENT
Start: 2023-03-25

## 2023-03-25 RX ORDER — CLONIDINE HYDROCHLORIDE 0.1 MG/1
0.1 TABLET ORAL 2 TIMES DAILY
COMMUNITY

## 2023-03-25 NOTE — ED NOTES
The patient was discharged home by Dr Bora Inman in stable condition. The patient is alert and oriented, in no respiratory distress. The patient's diagnosis, condition and treatment were explained. The patient expressed understanding. Prescriptions given/e-scribed to pharmacy. No work/school note given. A discharge plan has been developed. A  was not involved in the process. Aftercare instructions were given. Pt ambulatory out of the ED.

## 2023-03-25 NOTE — ED PROVIDER NOTES
20-year-old male with history of bipolar, initial cancer, ADHD, trigeminal neuralgia presents to the emergency department chief complaint of bilateral pinkeye for 2 days. He tells me he has had pain and discharge since that time. The history is provided by the patient and medical records. Pink Eye   This is a new problem. Past Medical History:   Diagnosis Date    Bipolar 1 disorder ([address]     Cancer ([facility] [address]     laryngeal-3 years ago, in remission    Kidney stone     Psychiatric disorder     ADHD    Trigeminal neuralgia        History reviewed. No pertinent surgical history. History reviewed. No pertinent family history. Social History     Socioeconomic History    Marital status: SINGLE     Spouse name: Not on file    Number of children: Not on file    Years of education: Not on file    Highest education level: Not on file   Occupational History    Not on file   Tobacco Use    Smoking status: Every Day     Packs/day: 1.50     Years: 5.00     Pack years: 7.50     Types: Cigarettes    Smokeless tobacco: Current   Substance and Sexual Activity    Alcohol use: Yes     Comment: occ    Drug use: No     Comment: hx of marijuana/lsd/hallucinogenics    Sexual activity: Not on file   Other Topics Concern    Not on file   Social History Narrative    ** Merged History Encounter **          Social Determinants of Health     Financial Resource Strain: Not on file   Food Insecurity: Not on file   Transportation Needs: Not on file   Physical Activity: Not on file   Stress: Not on file   Social Connections: Not on file   Intimate Partner Violence: Not on file   Housing Stability: Not on file         ALLERGIES: Seroquel [quetiapine]    Review of Systems    Vitals:    03/25/23 1317   BP: 123/64   Pulse: 70   Resp: 16   Temp: 97.7 °F (36.5 °C)   SpO2: 99%   Weight: 86 kg (189 lb 9.5 oz)   Height: 5' 11\" (1.803 m)            Physical Exam  Vitals and nursing note reviewed.    Constitutional:       General: He is not in acute distress. Appearance: Normal appearance. He is not ill-appearing. Eyes:      Conjunctiva/sclera:      Right eye: Right conjunctiva is injected. Left eye: Left conjunctiva is injected. Pupils: Pupils are equal, round, and reactive to light. Neurological:      Mental Status: He is alert. Medical Decision Making  49-year-old male presents as above with bilateral conjunctivitis. Will cover with Vigamox as he wears contact lenses. Recommend that he does not wear contacts during treatment. Amount and/or Complexity of Data Reviewed  External Data Reviewed: notes.            Procedures

## 2023-03-25 NOTE — Clinical Note
[address] EMERGENCY DEPT  [address] [facility] [address]  [phone]    Work/School Note    Date: 3/25/2023    To Whom It May concern:    Petty Fregoso was seen and treated today in the emergency room by the following provider(s):  Attending Provider: Uriah Butterfield MD.      Petty Fregoso is excused from work/school on 3/25/2023 through 3/27/2023. He is medically clear to return to work/school on 3/28/2023.          Sincerely,          Ada Padilla MD

## 2023-03-25 NOTE — Clinical Note
[address] EMERGENCY DEPT  [address] [facility] [address]  [phone]    Work/School Note    Date: 3/25/2023    To Whom It May concern:    Tiffanie Herbert was seen and treated today in the emergency room by the following provider(s):  Attending Provider: Clark Cervantes MD.      Tiffanie Herbert is excused from work/school on 3/25/2023 through 3/27/2023. He is medically clear to return to work/school on 3/28/2023.          Sincerely,          Oj Woodard MD

## 2023-05-22 RX ORDER — MOXIFLOXACIN 5 MG/ML
SOLUTION/ DROPS OPHTHALMIC
COMMUNITY
Start: 2023-03-25

## 2023-05-22 RX ORDER — BUPRENORPHINE AND NALOXONE 8; 2 MG/1; MG/1
FILM, SOLUBLE BUCCAL; SUBLINGUAL DAILY
COMMUNITY

## 2023-05-22 RX ORDER — CLONIDINE HYDROCHLORIDE 0.1 MG/1
0.1 TABLET ORAL 2 TIMES DAILY
COMMUNITY

## 2023-07-31 ENCOUNTER — APPOINTMENT (OUTPATIENT)
Facility: HOSPITAL | Age: 33
End: 2023-07-31
Payer: MEDICAID

## 2023-07-31 ENCOUNTER — HOSPITAL ENCOUNTER (EMERGENCY)
Facility: HOSPITAL | Age: 33
Discharge: HOME | End: 2023-07-31
Attending: EMERGENCY MEDICINE
Payer: MEDICAID

## 2023-07-31 VITALS
DIASTOLIC BLOOD PRESSURE: 78 MMHG | HEIGHT: 71 IN | TEMPERATURE: 97.7 F | HEART RATE: 91 BPM | OXYGEN SATURATION: 99 % | BODY MASS INDEX: 25.2 KG/M2 | RESPIRATION RATE: 16 BRPM | WEIGHT: 180 LBS | SYSTOLIC BLOOD PRESSURE: 136 MMHG

## 2023-07-31 DIAGNOSIS — S91.312A LACERATION OF LEFT FOOT, INITIAL ENCOUNTER: Primary | ICD-10-CM

## 2023-07-31 PROCEDURE — 12001 RPR S/N/AX/GEN/TRNK 2.5CM/<: CPT

## 2023-07-31 PROCEDURE — 99283 EMERGENCY DEPT VISIT LOW MDM: CPT

## 2023-07-31 PROCEDURE — 73630 X-RAY EXAM OF FOOT: CPT

## 2023-07-31 RX ORDER — LIDOCAINE HYDROCHLORIDE AND EPINEPHRINE BITARTRATE 20; .01 MG/ML; MG/ML
20 INJECTION, SOLUTION SUBCUTANEOUS
Status: DISCONTINUED | OUTPATIENT
Start: 2023-07-31 | End: 2023-07-31 | Stop reason: HOSPADM

## 2023-07-31 RX ORDER — CEPHALEXIN 500 MG/1
500 CAPSULE ORAL 3 TIMES DAILY
Qty: 21 CAPSULE | Refills: 0 | Status: SHIPPED | OUTPATIENT
Start: 2023-07-31 | End: 2023-08-07

## 2023-07-31 ASSESSMENT — PAIN - FUNCTIONAL ASSESSMENT: PAIN_FUNCTIONAL_ASSESSMENT: 0-10

## 2023-07-31 ASSESSMENT — ENCOUNTER SYMPTOMS
SHORTNESS OF BREATH: 0
ANAL BLEEDING: 0

## 2023-07-31 ASSESSMENT — PAIN SCALES - GENERAL: PAINLEVEL_OUTOF10: 6

## 2023-07-31 NOTE — ED PROVIDER NOTES
[facility] EMERGENCY DEPT  EMERGENCY DEPARTMENT ENCOUNTER      Pt Name: Fidel Hoffman  MRN: [id number]  [address] [facility] 1990  Date of evaluation: 7/31/2023  Provider: Greg Kelley       Chief Complaint   Patient presents with    Laceration         HISTORY OF PRESENT ILLNESS   (Location/Symptom, Timing/Onset, Context/Setting, Quality, Duration, Modifying Factors, Severity)  Note limiting factors. 29-year-old male presents emergency department chief complaint of cut to the left foot. Patient states that this happened approximately 2 hours before arrival.  He reports he stepped on some broken glass from a candle that had fallen to the floor last night. He reports drinking alcohol today as well as taking a second Suboxone to deal with the \"stress of the laceration\" states his tetanus is up-to-date. Denies any numbness or tingling. States he is able to ambulate but its difficult due to pain    The history is provided by the patient. Review of External Medical Records:     Nursing Notes were reviewed. REVIEW OF SYSTEMS    (2-9 systems for level 4, 10 or more for level 5)     Review of Systems   Constitutional:  Negative for activity change and appetite change. Respiratory:  Negative for shortness of breath. Gastrointestinal:  Negative for anal bleeding. Musculoskeletal:  Positive for gait problem. Skin:  Positive for wound. Neurological:  Negative for weakness. Hematological:  Does not bruise/bleed easily. Psychiatric/Behavioral:  The patient is nervous/anxious. All other systems reviewed and are negative. Except as noted above the remainder of the review of systems was reviewed and negative.        PAST MEDICAL HISTORY     Past Medical History:   Diagnosis Date    Bipolar 1 disorder ([address])     Cancer ([address])     laryngeal-3 years ago, in remission    Hepatitis C     Kidney stone     Psychiatric disorder     ADHD    Trigeminal neuralgia          SURGICAL HISTORY

## 2023-07-31 NOTE — ED TRIAGE NOTES
Pt reports stepping on \"a dwayne sized\" piece of glass this AM.  Injury to bottom of L foot. Reports tetanus vaccine UTD. Pt endorses being \"very entoxicated\" due to stress of injury. Home tourniquet w/bandana in place on arrival to ED.

## 2023-08-14 ENCOUNTER — HOSPITAL ENCOUNTER (EMERGENCY)
Facility: HOSPITAL | Age: 33
Discharge: HOME | End: 2023-08-14
Attending: EMERGENCY MEDICINE
Payer: MEDICAID

## 2023-08-14 VITALS
RESPIRATION RATE: 18 BRPM | OXYGEN SATURATION: 98 % | HEART RATE: 90 BPM | SYSTOLIC BLOOD PRESSURE: 132 MMHG | TEMPERATURE: 97.9 F | WEIGHT: 188.49 LBS | BODY MASS INDEX: 26.29 KG/M2 | DIASTOLIC BLOOD PRESSURE: 83 MMHG

## 2023-08-14 DIAGNOSIS — Z76.0 ENCOUNTER FOR MEDICATION REFILL: Primary | ICD-10-CM

## 2023-08-14 PROCEDURE — 99283 EMERGENCY DEPT VISIT LOW MDM: CPT

## 2023-08-14 RX ORDER — CLONIDINE HYDROCHLORIDE 0.2 MG/1
0.2 TABLET ORAL 2 TIMES DAILY
Qty: 8 TABLET | Refills: 0 | Status: SHIPPED | OUTPATIENT
Start: 2023-08-14 | End: 2023-08-18

## 2023-08-14 RX ORDER — CLONIDINE HYDROCHLORIDE 0.2 MG/1
0.2 TABLET ORAL 2 TIMES DAILY
Qty: 60 TABLET | Refills: 3 | Status: SHIPPED | OUTPATIENT
Start: 2023-08-14 | End: 2023-08-14 | Stop reason: SDUPTHER

## 2023-08-14 ASSESSMENT — ENCOUNTER SYMPTOMS
SHORTNESS OF BREATH: 0
SORE THROAT: 0
CONSTIPATION: 0

## 2023-08-14 ASSESSMENT — PAIN - FUNCTIONAL ASSESSMENT: PAIN_FUNCTIONAL_ASSESSMENT: NONE - DENIES PAIN

## 2023-08-15 NOTE — ED PROVIDER NOTES
[facility] EMERGENCY DEPT  EMERGENCY DEPARTMENT ENCOUNTER      Pt Name: Alphonse Duggan  MRN: [id number]  [address] [facility] [place] 1990  Date of evaluation: 8/14/2023  Provider: Juana Azar MD    [address]       Chief Complaint   Patient presents with    Medication Refill         HISTORY OF PRESENT ILLNESS   (Location/Symptom, Timing/Onset, Context/Setting, Quality, Duration, Modifying Factors, Severity)  Note limiting factors. 59-year-old male presents from home requesting a refill of his Lucrecia Derrick. Patient states he was on Suboxone and clonidine for treatment of his opioid use and anxiety. He took himself off Suboxone and his Suboxone doctor would no longer fill his clonidine prescription has been out now for a few days. He has an appointment coming up Thursday with his primary care doctor. States he is having some racing thoughts and difficulty sleeping without the medicine. The history is provided by the patient and a friend. Review of External Medical Records:     Nursing Notes were reviewed. REVIEW OF SYSTEMS    (2-9 systems for level 4, 10 or more for level 5)     Review of Systems   Constitutional:  Negative for fatigue. HENT:  Negative for sore throat. Eyes:  Negative for visual disturbance. Respiratory:  Negative for shortness of breath. Cardiovascular:  Negative for palpitations. Gastrointestinal:  Negative for constipation. Genitourinary:  Negative for difficulty urinating. Musculoskeletal:  Negative for myalgias. Skin:  Negative for rash. Except as noted above the remainder of the review of systems was reviewed and negative. PAST MEDICAL HISTORY     Past Medical History:   Diagnosis Date    Bipolar 1 disorder ([address])     Cancer ([address])     laryngeal-3 years ago, in remission    Hepatitis C     Kidney stone     Psychiatric disorder     ADHD    Trigeminal neuralgia          SURGICAL HISTORY     History reviewed. No pertinent surgical history.       CURRENT

## 2023-08-15 NOTE — ED NOTES
The patient was discharged home by Dr. Pierce and Nicolas Mcdermott rn in stable condition, accompanied by family The patient is alert and oriented, is in no respiratory distress and has vital signs within normal limits . The patient's diagnosis, condition and treatment were explained to patient. The patient  expressed understanding. No prescriptions given to pt. No work/school note given to pt. A discharge plan has been developed. A  was not involved in the process. Aftercare instructions were given to the patient.       Kishan Lomeli RN  08/14/23 3505

## 2023-08-26 ENCOUNTER — HOSPITAL ENCOUNTER (EMERGENCY)
Facility: HOSPITAL | Age: 33
Discharge: HOME | End: 2023-08-26
Attending: STUDENT IN AN ORGANIZED HEALTH CARE EDUCATION/TRAINING PROGRAM
Payer: MEDICAID

## 2023-08-26 VITALS
OXYGEN SATURATION: 96 % | DIASTOLIC BLOOD PRESSURE: 73 MMHG | HEIGHT: 72 IN | SYSTOLIC BLOOD PRESSURE: 124 MMHG | WEIGHT: 190.26 LBS | RESPIRATION RATE: 18 BRPM | BODY MASS INDEX: 25.77 KG/M2 | TEMPERATURE: 97.9 F | HEART RATE: 85 BPM

## 2023-08-26 DIAGNOSIS — Z76.0 ENCOUNTER FOR MEDICATION REFILL: Primary | ICD-10-CM

## 2023-08-26 PROCEDURE — 99283 EMERGENCY DEPT VISIT LOW MDM: CPT

## 2023-08-26 RX ORDER — CLONIDINE HYDROCHLORIDE 0.2 MG/1
0.2 TABLET ORAL 2 TIMES DAILY
Qty: 10 TABLET | Refills: 0 | Status: SHIPPED | OUTPATIENT
Start: 2023-08-26 | End: 2023-08-31

## 2023-08-26 ASSESSMENT — PAIN - FUNCTIONAL ASSESSMENT: PAIN_FUNCTIONAL_ASSESSMENT: 0-10

## 2023-08-26 ASSESSMENT — PAIN DESCRIPTION - DESCRIPTORS: DESCRIPTORS: ACHING

## 2023-08-26 ASSESSMENT — PAIN DESCRIPTION - PAIN TYPE: TYPE: ACUTE PAIN

## 2023-08-26 ASSESSMENT — PAIN DESCRIPTION - LOCATION: LOCATION: HEAD

## 2023-08-26 ASSESSMENT — PAIN DESCRIPTION - ONSET: ONSET: ON-GOING

## 2023-08-26 ASSESSMENT — PAIN DESCRIPTION - FREQUENCY: FREQUENCY: CONTINUOUS

## 2023-08-26 ASSESSMENT — PAIN SCALES - GENERAL: PAINLEVEL_OUTOF10: 3

## 2023-08-26 NOTE — ED NOTES
Pt discharged to home at this time. All discharge instructions provided to patient. All questions answered. No distress noted at time of discharge.       Alana Tran RN  08/26/23 1501

## 2023-08-26 NOTE — ED TRIAGE NOTES
Patient ambulatory to ED with friends, with a steady gait to treatment room. Patient has been experiencing \"anxiety\" since not having any of his prescription clonidine as of Wednesday. Patient also complains of headache, 5/10 pain. Denies N/V. Patient is not able to secure a PCP.

## 2023-09-16 ENCOUNTER — HOSPITAL ENCOUNTER (EMERGENCY)
Facility: HOSPITAL | Age: 33
Discharge: HOME | End: 2023-09-16
Attending: STUDENT IN AN ORGANIZED HEALTH CARE EDUCATION/TRAINING PROGRAM
Payer: MEDICAID

## 2023-09-16 VITALS
HEART RATE: 100 BPM | HEIGHT: 72 IN | BODY MASS INDEX: 25.98 KG/M2 | TEMPERATURE: 98.9 F | RESPIRATION RATE: 20 BRPM | OXYGEN SATURATION: 96 % | WEIGHT: 191.8 LBS | DIASTOLIC BLOOD PRESSURE: 95 MMHG | SYSTOLIC BLOOD PRESSURE: 128 MMHG

## 2023-09-16 DIAGNOSIS — D31.01: Primary | ICD-10-CM

## 2023-09-16 PROCEDURE — 99282 EMERGENCY DEPT VISIT SF MDM: CPT

## 2023-09-16 ASSESSMENT — VISUAL ACUITY
OS: 20/40
OU: 20/40
OD: 20/30

## 2023-09-16 NOTE — DISCHARGE INSTRUCTIONS
The exact etiology of the eye lesion is not clear. It is likely a chronic growth and may be benign or malignant. You should see an ophthalmologist to further diagnose this.   If you have any new or worsening symptoms please return
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
## 2023-09-16 NOTE — ED NOTES
The patient was discharged home by Dr Alex Mohamud in stable condition. The patient is alert and oriented, in no respiratory distress. The patient's diagnosis, condition and treatment were explained. The patient expressed understanding. No prescriptions given/e-scribed to pharmacy. No work/school note given. A discharge plan has been developed. A  was not involved in the process. Aftercare instructions were given. Pt ambulatory out of the ED with friend.        Son Monsalve RN  09/16/23 1165

## 2023-10-15 ENCOUNTER — APPOINTMENT (OUTPATIENT)
Facility: HOSPITAL | Age: 33
End: 2023-10-15
Payer: MEDICAID

## 2023-10-15 ENCOUNTER — HOSPITAL ENCOUNTER (EMERGENCY)
Facility: HOSPITAL | Age: 33
Discharge: HOME | End: 2023-10-15
Attending: EMERGENCY MEDICINE
Payer: MEDICAID

## 2023-10-15 VITALS
SYSTOLIC BLOOD PRESSURE: 138 MMHG | BODY MASS INDEX: 25.68 KG/M2 | WEIGHT: 189.6 LBS | HEIGHT: 72 IN | TEMPERATURE: 98.2 F | OXYGEN SATURATION: 97 % | RESPIRATION RATE: 16 BRPM | HEART RATE: 104 BPM | DIASTOLIC BLOOD PRESSURE: 87 MMHG

## 2023-10-15 DIAGNOSIS — S62.524A CLOSED NONDISPLACED FRACTURE OF DISTAL PHALANX OF RIGHT THUMB, INITIAL ENCOUNTER: Primary | ICD-10-CM

## 2023-10-15 PROCEDURE — 99283 EMERGENCY DEPT VISIT LOW MDM: CPT

## 2023-10-15 PROCEDURE — 73130 X-RAY EXAM OF HAND: CPT

## 2023-10-15 PROCEDURE — 29125 APPL SHORT ARM SPLINT STATIC: CPT

## 2023-10-15 RX ORDER — IBUPROFEN 200 MG
800 TABLET ORAL EVERY 6 HOURS PRN
COMMUNITY

## 2023-10-15 ASSESSMENT — PAIN DESCRIPTION - ORIENTATION: ORIENTATION: RIGHT

## 2023-10-15 ASSESSMENT — PAIN DESCRIPTION - DESCRIPTORS: DESCRIPTORS: ACHING

## 2023-10-15 ASSESSMENT — PAIN SCALES - GENERAL: PAINLEVEL_OUTOF10: 7

## 2023-10-15 ASSESSMENT — PAIN DESCRIPTION - PAIN TYPE: TYPE: ACUTE PAIN

## 2023-10-15 ASSESSMENT — PAIN - FUNCTIONAL ASSESSMENT: PAIN_FUNCTIONAL_ASSESSMENT: 0-10

## 2023-10-15 NOTE — ED NOTES
Thumb spica velcro splint applied to right thumb pt tolerates well.   Pt given discharge instructions by Dr Miranda Maldonado he verbalizes an understanding pt stable at time of discharge      Bonnie Diez RN  10/15/23 1918

## 2023-10-15 NOTE — ED PROVIDER NOTES
[facility] EMERGENCY DEPT  EMERGENCY DEPARTMENT ENCOUNTER      Pt Name: Irish Cranker  MRN: [id number]  [address] [facility] 1990  Date of evaluation: 10/15/2023  Provider: Greg Ott       Chief Complaint   Patient presents with    Finger Injury         HISTORY OF PRESENT ILLNESS   (Location/Symptom, Timing/Onset, Context/Setting, Quality, Duration, Modifying Factors, Severity)  Note limiting factors. HPI      Review of External Medical Records:     Nursing Notes were reviewed. REVIEW OF SYSTEMS    (2-9 systems for level 4, 10 or more for level 5)     Review of Systems    Except as noted above the remainder of the review of systems was reviewed and negative. PAST MEDICAL HISTORY     Past Medical History:   Diagnosis Date    Bipolar 1 disorder ([address])     Cancer ([address])     laryngeal-3 years ago, in remission    Hepatitis C     Kidney stone     Psychiatric disorder     ADHD    Trigeminal neuralgia          SURGICAL HISTORY     History reviewed. No pertinent surgical history. CURRENT MEDICATIONS       Previous Medications    BUPRENORPHINE-NALOXONE (SUBOXONE) 8-2 MG FILM SL FILM    Place 1 Film under the tongue in the morning and at bedtime. CLONIDINE (CATAPRES) 0.2 MG TABLET    Take 1 tablet by mouth 2 times daily for 5 days    IBUPROFEN (ADVIL;MOTRIN) 200 MG TABLET    Take 4 tablets by mouth every 6 hours as needed for Pain       ALLERGIES     Quetiapine    FAMILY HISTORY     History reviewed. No pertinent family history.        SOCIAL HISTORY       Social History     Socioeconomic History    Marital status: Single     Spouse name: None    Number of children: None    Years of education: None    Highest education level: None   Tobacco Use    Smoking status: Every Day     Packs/day: 1.5     Types: Cigarettes    Smokeless tobacco: Current   Substance and Sexual Activity    Alcohol use: Yes     Comment: 7    Drug use: No   Social History Narrative    ** Merged History Encounter **
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
## 2023-11-17 ENCOUNTER — HOSPITAL ENCOUNTER (EMERGENCY)
Facility: HOSPITAL | Age: 33
Discharge: HOME | End: 2023-11-17
Attending: STUDENT IN AN ORGANIZED HEALTH CARE EDUCATION/TRAINING PROGRAM
Payer: MEDICAID

## 2023-11-17 VITALS
HEIGHT: 71 IN | SYSTOLIC BLOOD PRESSURE: 139 MMHG | OXYGEN SATURATION: 98 % | TEMPERATURE: 99 F | RESPIRATION RATE: 16 BRPM | HEART RATE: 94 BPM | DIASTOLIC BLOOD PRESSURE: 72 MMHG | WEIGHT: 194.89 LBS | BODY MASS INDEX: 27.28 KG/M2

## 2023-11-17 DIAGNOSIS — G50.0 TRIGEMINAL NEURALGIA: Primary | ICD-10-CM

## 2023-11-17 PROCEDURE — 6370000000 HC RX 637 (ALT 250 FOR IP): Performed by: STUDENT IN AN ORGANIZED HEALTH CARE EDUCATION/TRAINING PROGRAM

## 2023-11-17 PROCEDURE — 99283 EMERGENCY DEPT VISIT LOW MDM: CPT

## 2023-11-17 RX ORDER — GABAPENTIN 300 MG/1
300 CAPSULE ORAL 3 TIMES DAILY
Qty: 180 CAPSULE | Refills: 0 | Status: SHIPPED | OUTPATIENT
Start: 2023-11-17 | End: 2024-01-16

## 2023-11-17 RX ORDER — GABAPENTIN 300 MG/1
300 CAPSULE ORAL ONCE
Status: COMPLETED | OUTPATIENT
Start: 2023-11-17 | End: 2023-11-17

## 2023-11-17 RX ORDER — GABAPENTIN 300 MG/1
300 CAPSULE ORAL 3 TIMES DAILY
Qty: 180 CAPSULE | Refills: 0 | Status: SHIPPED | OUTPATIENT
Start: 2023-11-17 | End: 2023-11-17 | Stop reason: SDUPTHER

## 2023-11-17 RX ADMIN — GABAPENTIN 300 MG: 300 CAPSULE ORAL at 12:30

## 2023-11-17 RX ADMIN — LIDOCAINE HYDROCHLORIDE: 20 SOLUTION ORAL; TOPICAL at 12:11

## 2023-11-17 ASSESSMENT — PAIN DESCRIPTION - DESCRIPTORS: DESCRIPTORS: SHARP

## 2023-11-17 ASSESSMENT — PAIN - FUNCTIONAL ASSESSMENT: PAIN_FUNCTIONAL_ASSESSMENT: 0-10

## 2023-11-17 ASSESSMENT — PAIN SCALES - GENERAL: PAINLEVEL_OUTOF10: 9

## 2023-11-17 ASSESSMENT — PAIN DESCRIPTION - PAIN TYPE: TYPE: ACUTE PAIN

## 2023-11-17 ASSESSMENT — PAIN DESCRIPTION - ORIENTATION: ORIENTATION: LEFT

## 2023-11-17 ASSESSMENT — PAIN DESCRIPTION - LOCATION: LOCATION: JAW

## 2023-11-17 NOTE — ED TRIAGE NOTES
Pt ambulates to treatment area he states that for the past 1.5 weeks he has had his trigeminal neuralgia flaring up and that he stopped taking his Suboxin that helped with the pain. He states that he also has horrible teeth on that side and that dental balls we give help him.
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
## 2023-11-18 ENCOUNTER — HOSPITAL ENCOUNTER (EMERGENCY)
Facility: HOSPITAL | Age: 33
Discharge: HOME | End: 2023-11-18
Attending: EMERGENCY MEDICINE
Payer: MEDICAID

## 2023-11-18 VITALS
SYSTOLIC BLOOD PRESSURE: 131 MMHG | TEMPERATURE: 98.2 F | WEIGHT: 194.89 LBS | RESPIRATION RATE: 16 BRPM | BODY MASS INDEX: 27.28 KG/M2 | HEIGHT: 71 IN | HEART RATE: 80 BPM | OXYGEN SATURATION: 99 % | DIASTOLIC BLOOD PRESSURE: 80 MMHG

## 2023-11-18 DIAGNOSIS — G50.0 TRIGEMINAL NEURALGIA: Primary | ICD-10-CM

## 2023-11-18 DIAGNOSIS — K08.89 PAIN, DENTAL: ICD-10-CM

## 2023-11-18 PROCEDURE — 6370000000 HC RX 637 (ALT 250 FOR IP): Performed by: EMERGENCY MEDICINE

## 2023-11-18 RX ORDER — IBUPROFEN 600 MG/1
600 TABLET ORAL EVERY 6 HOURS PRN
Qty: 28 TABLET | Refills: 0 | Status: SHIPPED | OUTPATIENT
Start: 2023-11-18 | End: 2023-11-25

## 2023-11-18 RX ORDER — CLINDAMYCIN HYDROCHLORIDE 150 MG/1
300 CAPSULE ORAL
Status: COMPLETED | OUTPATIENT
Start: 2023-11-18 | End: 2023-11-18

## 2023-11-18 RX ORDER — ACETAMINOPHEN 500 MG
1000 TABLET ORAL 3 TIMES DAILY
Qty: 120 TABLET | Refills: 3 | Status: SHIPPED | OUTPATIENT
Start: 2023-11-18

## 2023-11-18 RX ORDER — CLINDAMYCIN HYDROCHLORIDE 300 MG/1
300 CAPSULE ORAL 3 TIMES DAILY
Qty: 21 CAPSULE | Refills: 0 | Status: SHIPPED | OUTPATIENT
Start: 2023-11-18 | End: 2023-11-25

## 2023-11-18 RX ADMIN — BENZOCAINE: 200 SPRAY DENTAL; ORAL; PERIODONTAL at 02:19

## 2023-11-18 RX ADMIN — DIPHENHYDRAMINE HYDROCHLORIDE: 12.5 SOLUTION ORAL at 01:18

## 2023-11-18 RX ADMIN — CLINDAMYCIN HYDROCHLORIDE 300 MG: 150 CAPSULE ORAL at 01:59

## 2023-11-18 ASSESSMENT — PAIN - FUNCTIONAL ASSESSMENT: PAIN_FUNCTIONAL_ASSESSMENT: 0-10

## 2023-11-18 ASSESSMENT — PAIN SCALES - GENERAL: PAINLEVEL_OUTOF10: 10

## 2023-11-18 NOTE — ED TRIAGE NOTES
Seen here today for trigeminal neuralgia.  Patient is requesting more dental balls and another script for pain

## 2024-03-02 ENCOUNTER — HOSPITAL ENCOUNTER (EMERGENCY)
Facility: HOSPITAL | Age: 34
Discharge: HOME | End: 2024-03-02
Attending: STUDENT IN AN ORGANIZED HEALTH CARE EDUCATION/TRAINING PROGRAM

## 2024-03-02 VITALS
OXYGEN SATURATION: 98 % | DIASTOLIC BLOOD PRESSURE: 87 MMHG | WEIGHT: 196.65 LBS | RESPIRATION RATE: 18 BRPM | TEMPERATURE: 98 F | HEART RATE: 92 BPM | BODY MASS INDEX: 27.53 KG/M2 | HEIGHT: 71 IN | SYSTOLIC BLOOD PRESSURE: 152 MMHG

## 2024-03-02 DIAGNOSIS — G50.0 TRIGEMINAL NEURALGIA: ICD-10-CM

## 2024-03-02 DIAGNOSIS — G50.0 TRIGEMINAL NEURALGIA OF LEFT SIDE OF FACE: ICD-10-CM

## 2024-03-02 DIAGNOSIS — K08.89 PAIN, DENTAL: Primary | ICD-10-CM

## 2024-03-02 PROCEDURE — 6370000000 HC RX 637 (ALT 250 FOR IP)

## 2024-03-02 PROCEDURE — 99283 EMERGENCY DEPT VISIT LOW MDM: CPT

## 2024-03-02 RX ORDER — AMOXICILLIN AND CLAVULANATE POTASSIUM 875; 125 MG/1; MG/1
1 TABLET, FILM COATED ORAL 2 TIMES DAILY
Qty: 14 TABLET | Refills: 0 | Status: SHIPPED | OUTPATIENT
Start: 2024-03-02 | End: 2024-03-09

## 2024-03-02 RX ORDER — GABAPENTIN 300 MG/1
300 CAPSULE ORAL 3 TIMES DAILY
Qty: 90 CAPSULE | Refills: 0 | Status: SHIPPED | OUTPATIENT
Start: 2024-03-02 | End: 2024-04-01

## 2024-03-02 RX ORDER — GABAPENTIN 600 MG/1
300 TABLET ORAL ONCE
Status: COMPLETED | OUTPATIENT
Start: 2024-03-02 | End: 2024-03-02

## 2024-03-02 RX ADMIN — GABAPENTIN 300 MG: 600 TABLET, FILM COATED ORAL at 22:53

## 2024-03-02 RX ADMIN — Medication: at 22:53

## 2024-03-02 ASSESSMENT — PAIN - FUNCTIONAL ASSESSMENT: PAIN_FUNCTIONAL_ASSESSMENT: PREVENTS OR INTERFERES SOME ACTIVE ACTIVITIES AND ADLS

## 2024-03-02 ASSESSMENT — PAIN SCALES - GENERAL: PAINLEVEL_OUTOF10: 8

## 2024-03-02 ASSESSMENT — PAIN DESCRIPTION - FREQUENCY: FREQUENCY: CONTINUOUS

## 2024-03-02 ASSESSMENT — PAIN DESCRIPTION - LOCATION: LOCATION: TEETH

## 2024-03-02 ASSESSMENT — PAIN DESCRIPTION - ONSET: ONSET: PROGRESSIVE

## 2024-03-02 ASSESSMENT — PAIN DESCRIPTION - DESCRIPTORS: DESCRIPTORS: ACHING;SORE

## 2024-03-02 ASSESSMENT — PAIN DESCRIPTION - ORIENTATION: ORIENTATION: LEFT;UPPER

## 2024-03-02 ASSESSMENT — PAIN DESCRIPTION - PAIN TYPE: TYPE: ACUTE PAIN

## 2024-03-03 NOTE — ED PROVIDER NOTES
[facility] EMERGENCY DEP  EMERGENCY DEPARTMENT ENCOUNTER      Pt Name: Lucas Mccord  MRN: [id number]  Birthdate 1990  Date of evaluation: 3/2/2024  Provider: Ziyad Enriquez PA-C    CHIEF COMPLAINT       Chief Complaint   Patient presents with    Dental Pain         HISTORY OF PRESENT ILLNESS    Patient is a 33-year-old male with history of bipolar disorder, laryngeal cancer, hepatitis C, kidney stones, trigeminal neuralgia, dental caries, and ADHD who presents emergency room with reports of left upper dental pain/known abscess.  Patient reports that he usually takes Neurontin, because the dental abscess or dental infections usually push on his nerves which causes his trigeminal neuralgia to active.  Patient reports he has been unable to sleep in 5 days.  Patient reports he has been living off of [employer] and [employer].  Patient reports Tylenol and Motrin at home does not help with the pain. Patient denies chest pain, shortness of breath, abdominal pain, urinary symptoms, nausea or vomiting, diarrhea or constipation, headache, dizziness, lightheadedness, fever or chills.  Patient denies any trouble swallowing, trouble breathing, or trismus.  Patient reports occasional alcohol use, everyday cigarette smoking, and denies any illicit drug use.    Per chart review patient has seen in the past for dental pain and dental balls really help.  Patient was given gabapentin in the past for 30-day supply.  Patient has not followed up with [facility] dent[facility] or any dentist.  Patient reports that the pain usually gets better so he ends up not wanting to follow-up.          Nursing Notes were reviewed.    REVIEW OF SYSTEMS       Review of Systems   HENT:  Positive for dental problem.    All other systems reviewed and are negative.        PAST MEDICAL HISTORY     Past Medical History:   Diagnosis Date    Bipolar 1 disorder (HCC)     Cancer (H[facility])     laryngeal-3 years ago, in remission    Hepatitis C     Kidney stone
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
## 2024-03-03 NOTE — ED NOTES
Patient was upset and wanted additional pain medication to be sent in to his pharmacy. Advised patient per the discharge instructions he will need to follow up with [facility] for treatment of the dental pain and to alternate between Tylenol or Ibproufen every 6 to 8 hours as needed for pain. Patient stated \"this is not going to work, I need something stronger.\" Patient then gathered his belongings and stated \"Don't worry about it, I'll just go somewhere else, I'll go to [facility]\". Patient left out of [facility] treatment area. Notified provider.

## 2024-03-03 NOTE — DISCHARGE INSTRUCTIONS
Discussed visit today.  Please follow-up with [facility] by showing up around 8 AM and getting an appointment.    Return to the emergency room with any worsening of symptoms.

## (undated) DEVICE — INTENDED FOR TISSUE SEPARATION, AND OTHER PROCEDURES THAT REQUIRE A SHARP SURGICAL BLADE TO PUNCTURE OR CUT.: Brand: BARD-PARKER ® CARBON RIB-BACK BLADES

## (undated) DEVICE — PACK,BASIC,SIRUS,V: Brand: MEDLINE

## (undated) DEVICE — BANDAGE,GAUZE,BULKEE II,4.5"X4.1YD,STRL: Brand: MEDLINE

## (undated) DEVICE — 1200 GUARD II KIT W/5MM TUBE W/O VAC TUBE: Brand: GUARDIAN

## (undated) DEVICE — ROCKER SWITCH PENCIL BLADE ELECTRODE, HOLSTER: Brand: EDGE

## (undated) DEVICE — PADDING CAST SPEC 6INX4YD COT --

## (undated) DEVICE — SPONGE GZ W4XL4IN COT 12 PLY TYP VII WVN C FLD DSGN

## (undated) DEVICE — STRAP POS KNEE BODY VELC

## (undated) DEVICE — INFECTION CONTROL KIT SYS

## (undated) DEVICE — HANDLE LT SNAP ON ULT DURABLE LENS FOR TRUMPF ALC DISPOSABLE

## (undated) DEVICE — DRAPE,U/ SHT,SPLIT,PLAS,STERIL: Brand: MEDLINE

## (undated) DEVICE — DRAPE,EXTREMITY,89X128,STERILE: Brand: MEDLINE

## (undated) DEVICE — PAD,ABDOMINAL,5"X9",ST,LF,25/BX: Brand: MEDLINE INDUSTRIES, INC.

## (undated) DEVICE — REM POLYHESIVE ADULT PATIENT RETURN ELECTRODE: Brand: VALLEYLAB

## (undated) DEVICE — TOWEL SURG W17XL27IN STD BLU COT NONFENESTRATED PREWASHED

## (undated) DEVICE — SOLUTION IV 1000ML 0.9% SOD CHL

## (undated) DEVICE — SURGICAL PROCEDURE PACK BASIN MAJ SET CUST NO CAUT

## (undated) DEVICE — DRAPE,REIN 53X77,STERILE: Brand: MEDLINE